# Patient Record
Sex: FEMALE | Race: WHITE | Employment: FULL TIME | URBAN - NONMETROPOLITAN AREA
[De-identification: names, ages, dates, MRNs, and addresses within clinical notes are randomized per-mention and may not be internally consistent; named-entity substitution may affect disease eponyms.]

---

## 2020-11-30 ENCOUNTER — HOSPITAL ENCOUNTER (EMERGENCY)
Age: 25
Discharge: HOME | End: 2020-11-30
Attending: EMERGENCY MEDICINE

## 2020-11-30 VITALS
SYSTOLIC BLOOD PRESSURE: 153 MMHG | OXYGEN SATURATION: 100 % | HEIGHT: 62 IN | BODY MASS INDEX: 43.06 KG/M2 | TEMPERATURE: 98.1 F | HEART RATE: 81 BPM | DIASTOLIC BLOOD PRESSURE: 85 MMHG | WEIGHT: 234 LBS | RESPIRATION RATE: 21 BRPM

## 2020-11-30 DIAGNOSIS — I10 HYPERTENSION, UNSPECIFIED TYPE: ICD-10-CM

## 2020-11-30 DIAGNOSIS — R42 DIZZINESS: Primary | ICD-10-CM

## 2020-11-30 LAB
ALBUMIN SERPL-MCNC: 3.9 G/DL (ref 3.5–4.7)
ALBUMIN/GLOB SERPL: 1.2 {RATIO}
ALP SERPL-CCNC: 66 U/L (ref 38–126)
ALT SERPL-CCNC: 76 U/L (ref 3–52)
ANION GAP SERPL CALC-SCNC: 8 MMOL/L
APPEARANCE UR: ABNORMAL
AST SERPL W P-5'-P-CCNC: 44 U/L (ref 14–74)
ATRIAL RATE: 80 BPM
BACTERIA URNS QL MICRO: ABNORMAL /HPF
BASOPHILS # BLD: 0 K/UL (ref 0–0.1)
BASOPHILS NFR BLD: 1 % (ref 0–2)
BILIRUB SERPL-MCNC: 0.4 MG/DL (ref 0.2–1)
BILIRUB UR QL: NEGATIVE
BUN SERPL-MCNC: 12 MG/DL (ref 9–21)
BUN/CREAT SERPL: 17
CA-I BLD-MCNC: 8.9 MG/DL (ref 8.5–10.5)
CALCULATED P AXIS, ECG09: 56 DEGREES
CALCULATED R AXIS, ECG10: 34 DEGREES
CALCULATED T AXIS, ECG11: -4 DEGREES
CHLORIDE SERPL-SCNC: 104 MMOL/L (ref 94–111)
CO2 SERPL-SCNC: 26 MMOL/L (ref 21–33)
COLOR UR: ABNORMAL
CREAT SERPL-MCNC: 0.7 MG/DL (ref 0.7–1.2)
DIAGNOSIS, 93000: NORMAL
EOSINOPHIL # BLD: 0.2 K/UL (ref 0–0.4)
EOSINOPHIL NFR BLD: 3 % (ref 0–5)
EPITH CASTS URNS QL MICRO: ABNORMAL /LPF (ref 0–20)
ERYTHROCYTE [DISTWIDTH] IN BLOOD BY AUTOMATED COUNT: 12.5 % (ref 11.6–14.5)
GLOBULIN SER CALC-MCNC: 3.2 G/DL
GLUCOSE SERPL-MCNC: 107 MG/DL (ref 70–110)
GLUCOSE UR STRIP.AUTO-MCNC: NEGATIVE MG/DL
HCG UR QL: NEGATIVE
HCT VFR BLD AUTO: 36.2 % (ref 35–45)
HGB BLD-MCNC: 11.8 G/DL (ref 12–16)
HGB UR QL STRIP: ABNORMAL
IMM GRANULOCYTES # BLD AUTO: 0 K/UL
IMM GRANULOCYTES NFR BLD AUTO: 0 %
KETONES UR QL STRIP.AUTO: NEGATIVE MG/DL
LEUKOCYTE ESTERASE UR QL STRIP.AUTO: ABNORMAL
LYMPHOCYTES # BLD: 1.8 K/UL (ref 0.9–3.6)
LYMPHOCYTES NFR BLD: 29 % (ref 21–52)
MCH RBC QN AUTO: 28.8 PG (ref 24–34)
MCHC RBC AUTO-ENTMCNC: 32.6 G/DL (ref 31–37)
MCV RBC AUTO: 88.3 FL (ref 74–97)
MONOCYTES # BLD: 0.3 K/UL (ref 0.05–1.2)
MONOCYTES NFR BLD: 5 % (ref 3–10)
NEUTS SEG # BLD: 4 K/UL (ref 1.8–8)
NEUTS SEG NFR BLD: 62 % (ref 40–73)
NITRITE UR QL STRIP.AUTO: NEGATIVE
P-R INTERVAL, ECG05: 145 MS
PH UR STRIP: 5 [PH] (ref 5–9)
PLATELET # BLD AUTO: 270 K/UL (ref 135–420)
PMV BLD AUTO: 10.2 FL (ref 9.2–11.8)
POTASSIUM SERPL-SCNC: 3.9 MMOL/L (ref 3.2–5.1)
PROT SERPL-MCNC: 7.1 G/DL (ref 6.1–8.4)
PROT UR STRIP-MCNC: 15 MG/DL
Q-T INTERVAL, ECG07: 375 MS
QRS DURATION, ECG06: 94 MS
QTC CALCULATION (BEZET), ECG08: 433 MS
RBC # BLD AUTO: 4.1 M/UL (ref 4.2–5.3)
RBC #/AREA URNS HPF: ABNORMAL /HPF (ref 0–2)
SODIUM SERPL-SCNC: 138 MMOL/L (ref 135–145)
SP GR UR REFRACTOMETRY: 1.02 (ref 1–1.03)
TRICHOMONAS UR QL MICRO: PRESENT
TROPONIN I SERPL-MCNC: <0.02 NG/ML (ref 0.02–0.05)
UROBILINOGEN UR QL STRIP.AUTO: 0.2 EU/DL (ref 0.2–1)
VENTRICULAR RATE, ECG03: 80 BPM
WBC # BLD AUTO: 6.4 K/UL (ref 4.6–13.2)
WBC URNS QL MICRO: ABNORMAL /HPF (ref 0–4)

## 2020-11-30 PROCEDURE — 80053 COMPREHEN METABOLIC PANEL: CPT

## 2020-11-30 PROCEDURE — 81001 URINALYSIS AUTO W/SCOPE: CPT

## 2020-11-30 PROCEDURE — 81025 URINE PREGNANCY TEST: CPT

## 2020-11-30 PROCEDURE — 84484 ASSAY OF TROPONIN QUANT: CPT

## 2020-11-30 PROCEDURE — 99284 EMERGENCY DEPT VISIT MOD MDM: CPT

## 2020-11-30 PROCEDURE — 85025 COMPLETE CBC W/AUTO DIFF WBC: CPT

## 2020-11-30 PROCEDURE — 93005 ELECTROCARDIOGRAM TRACING: CPT

## 2020-11-30 RX ORDER — METFORMIN HYDROCHLORIDE 500 MG/1
500 TABLET ORAL 2 TIMES DAILY WITH MEALS
COMMUNITY

## 2020-11-30 RX ORDER — DILTIAZEM HYDROCHLORIDE EXTENDED-RELEASE TABLETS 240 MG/1
240 TABLET, EXTENDED RELEASE ORAL DAILY
COMMUNITY
End: 2022-07-15

## 2020-11-30 NOTE — LETTER
[address] EMERGENCY DEPT 
[facility] [id number] 
[phone] Work/School Note Date: 11/30/2020 To Whom It May concern: 
 
Anabella Estevez was seen and treated today in the emergency room by the following provider(s): 
Attending Provider: Marleny Mcclain MD.   
 
Anabella Estevez is excused from work/school on 11/30/20 and 12/01/20. She is medically clear to return to work/school on 12/2/2020.   
 
 
Sincerely,

## 2020-11-30 NOTE — DISCHARGE INSTRUCTIONS
Please see your doctor or clinic in 24-48 hours. Please return for increased pain, fevers, chills, shortness of breath or any other concerns.

## 2020-11-30 NOTE — ED PROVIDER NOTES
EMERGENCY DEPARTMENT HISTORY AND PHYSICAL EXAM      Date: 2020  Patient Name: Thao Pearson    History of Presenting Illness     Chief Complaint   Patient presents with    Dizziness    Hypertension       History Provided By: Patient    HPI: Thao Pearson, 30 y.o. female with PMHx of DM presents to the ED with complaints of lightheadedness/dizziness which is worse with ambulation, beginning when she woke up this morning (approximately 5-6 hours ago). Pt notes associated decreased appetite and nausea, but was able to eat breakfast despite these sxs. She arrived at work and confessed to a coworker that she was not feeling well. Her coworker checked her BP, finding it to be 160/103, and encouraged her to visit the ED. Pt denies any vomiting, chest pain, changes in bowel/bladder function, fever, chills, anosmia, or ageusia. 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 Her menses began today and has been regular. Pt reports a BGL of 125 mg/dL this morning, states her sugars have been normal recently. There are no other complaints, changes, or physical findings at this time. PCP: JORDI Harvey    No current facility-administered medications on file prior to encounter. Current Outpatient Medications on File Prior to Encounter   Medication Sig Dispense Refill    dilTIAZem ER (Cardizem LA) 240 mg tablet Take 240 mg by mouth daily.  metFORMIN (GLUCOPHAGE) 500 mg tablet Take 500 mg by mouth two (2) times daily (with meals). Past History     Past Medical History:  Past Medical History:   Diagnosis Date    Atrial fibrillation ([address]     Diabetes ([facility]        Past Surgical History:  Past Surgical History:   Procedure Laterality Date    HX  SECTION N/A     x 2       Family History:  History reviewed. No pertinent family history.     Social History:  Social History     Tobacco Use    Smoking status: Never Smoker    Smokeless tobacco: Never Used   Substance Use Topics    Alcohol use: Yes     Comment: occ    Drug use: Never       Allergies: Allergies   Allergen Reactions    Doxycycline Nausea and Vomiting         Review of Systems   Review of Systems   Constitutional: Positive for appetite change (decreased). Negative for chills, fatigue and fever. HENT: Negative for congestion. No anosmia or ageusia. Eyes: Negative for visual disturbance. Respiratory: Negative for shortness of breath. Cardiovascular: Negative for chest pain and leg swelling. Gastrointestinal: Positive for nausea. Negative for abdominal pain, constipation, diarrhea and vomiting. Endocrine: Negative for polyuria. Genitourinary: Negative for dysuria and menstrual problem. Skin: Negative for rash. Neurological: Positive for dizziness and light-headedness. Negative for weakness. Psychiatric/Behavioral: Negative for behavioral problems. Physical Exam   Physical Exam  Vitals signs and nursing note reviewed. Constitutional:       General: She is awake. She is not in acute distress. Appearance: She is well-developed. She is not diaphoretic. HENT:      Head: Normocephalic and atraumatic. Right Ear: External ear normal.      Left Ear: External ear normal.      Mouth/Throat:      Mouth: Mucous membranes are moist.   Eyes:      General: No scleral icterus. Extraocular Movements: Extraocular movements intact. Conjunctiva/sclera: Conjunctivae normal.      Pupils: Pupils are equal, round, and reactive to light. Neck:      Musculoskeletal: Normal range of motion and neck supple. Thyroid: No thyromegaly. Vascular: No JVD. Trachea: No tracheal deviation. Cardiovascular:      Rate and Rhythm: Normal rate and regular rhythm. Heart sounds: Normal heart sounds. No murmur. No friction rub. No gallop. Pulmonary:      Effort: Pulmonary effort is normal. No respiratory distress.       Breath sounds: Normal breath sounds. No stridor. No wheezing, rhonchi or rales. Abdominal:      General: There is no distension. Palpations: Abdomen is soft. Tenderness: There is no abdominal tenderness. There is no guarding or rebound. Musculoskeletal: Normal range of motion. General: No tenderness. Lymphadenopathy:      Cervical: No cervical adenopathy. Skin:     General: Skin is warm and dry. Findings: No erythema or rash. Neurological:      Mental Status: She is alert and oriented to person, place, and time. Cranial Nerves: No cranial nerve deficit. Sensory: Sensation is intact. Motor: Motor function is intact. No weakness or pronator drift. Coordination: Coordination is intact. Finger-Nose-Finger Test normal.   Psychiatric:         Behavior: Behavior normal.         Thought Content:  Thought content normal.         Judgment: Judgment normal.         Diagnostic Study Results     Labs -     Recent Results (from the past 12 hour(s))   URINALYSIS W/ RFLX MICROSCOPIC    Collection Time: 11/30/20 12:30 PM   Result Value Ref Range    Color Yellow/Straw      Appearance Hazy (A) Clear      Specific gravity 1.025 1.003 - 1.035      pH (UA) 5.0 5.0 - 9.0      Protein 15 (A) Negative mg/dL    Glucose Negative Negative mg/dL    Ketone Negative Negative mg/dL    Bilirubin Negative Negative      Blood Large (A) Negative      Urobilinogen 0.2 0.2 - 1.0 EU/dL    Nitrites Negative Negative      Leukocyte Esterase Small (A) Negative     HCG URINE, QL    Collection Time: 11/30/20 12:30 PM   Result Value Ref Range    HCG urine, QL Negative Negative     URINE MICROSCOPIC    Collection Time: 11/30/20 12:30 PM   Result Value Ref Range    WBC 20-50 0 - 4 /hpf    RBC 0-5 0 - 2 /hpf    Epithelial cells Many 0 - 20 /lpf    Bacteria 1+ (A) None /hpf    Trichomonas Present     EKG, 12 LEAD, INITIAL    Collection Time: 11/30/20  1:27 PM   Result Value Ref Range    Ventricular Rate 80 BPM    Atrial Rate 80 BPM P-R Interval 145 ms    QRS Duration 94 ms    Q-T Interval 375 ms    QTC Calculation (Bezet) 433 ms    Calculated P Axis 56 degrees    Calculated R Axis 34 degrees    Calculated T Axis -4 degrees    Diagnosis       Sinus rhythm  Borderline T abnormalities, anterior leads     CBC WITH AUTOMATED DIFF    Collection Time: 11/30/20  2:00 PM   Result Value Ref Range    WBC 6.4 4.6 - 13.2 K/uL    RBC 4.10 (L) 4.20 - 5.30 M/uL    HGB 11.8 (L) 12.0 - 16.0 g/dL    HCT 36.2 35.0 - 45.0 %    MCV 88.3 74.0 - 97.0 FL    MCH 28.8 24.0 - 34.0 PG    MCHC 32.6 31.0 - 37.0 g/dL    RDW 12.5 11.6 - 14.5 %    PLATELET 840 393 - 718 K/uL    MPV 10.2 9.2 - 11.8 FL    NEUTROPHILS 62 40 - 73 %    LYMPHOCYTES 29 21 - 52 %    MONOCYTES 5 3 - 10 %    EOSINOPHILS 3 0 - 5 %    BASOPHILS 1 0 - 2 %    IMMATURE GRANULOCYTES 0 %    ABS. NEUTROPHILS 4.0 1.8 - 8.0 K/UL    ABS. LYMPHOCYTES 1.8 0.9 - 3.6 K/UL    ABS. MONOCYTES 0.3 0.05 - 1.2 K/UL    ABS. EOSINOPHILS 0.2 0.0 - 0.4 K/UL    ABS. BASOPHILS 0.0 0.0 - 0.1 K/UL    ABS. IMM. GRANS. 0.0 K/UL   METABOLIC PANEL, COMPREHENSIVE    Collection Time: 11/30/20  2:00 PM   Result Value Ref Range    Sodium 138 135 - 145 mmol/L    Potassium 3.9 3.2 - 5.1 mmol/L    Chloride 104 94 - 111 mmol/L    CO2 26 21 - 33 mmol/L    Anion gap 8 mmol/L    Glucose 107 70 - 110 mg/dL    BUN 12 9 - 21 mg/dL    Creatinine 0.70 0.70 - 1.20 mg/dL    BUN/Creatinine ratio 17      GFR est AA >60 ml/min/1.73m2    GFR est non-AA >60 ml/min/1.73m2    Calcium 8.9 8.5 - 10.5 mg/dL    Bilirubin, total 0.4 0.2 - 1.0 mg/dL    AST (SGOT) 44 14 - 74 U/L    ALT (SGPT) 76 (H) 3 - 52 U/L    Alk.  phosphatase 66 38 - 126 U/L    Protein, total 7.1 6.1 - 8.4 g/dL    Albumin 3.9 3.5 - 4.7 g/dL    Globulin 3.2 g/dL    A-G Ratio 1.2     TROPONIN I    Collection Time: 11/30/20  2:00 PM   Result Value Ref Range    Troponin-I, Qt. <0.02 (L) 0.02 - 0.05 ng/mL       Radiologic Studies -   No orders to display     CT Results  (Last 48 hours)    None CXR Results  (Last 48 hours)    None            Medical Decision Making   I am the first provider for this patient. I reviewed the vital signs, available nursing notes, past medical history, past surgical history, family history and social history. Vital Signs-Reviewed the patient's vital signs. Patient Vitals for the past 12 hrs:   Temp Pulse Resp BP SpO2   11/30/20 1300 98.2 °F (36.8 °C) 83 20 (!) 146/75 100 %     EKG interpretation: (Preliminary)  NSR. Isolated T wave inversion, lead III. T wave flattening, lateral leads. No prior for comparison. EKG read by Dr. Ronal Vega at 6988. Records Reviewed: Nursing Notes    ED Course as of Nov 30 1702   Mon Nov 30, 2020   1304 Pt well appearing, non focal neuro exam- Given DM will screen trop, low suspicion for ACS, given length of sx, if trop neg will hold on further testing    [BT]   7897 Provider Attestation:  I personally performed the services described in the documentation, reviewed the documentation, as recorded by the chevy Vega MD (2:33 PM). [BT]      ED Course User Index  [BT] Chirag Mcelroy MD         ED Course:   0206  Initial assessment performed. The patients presenting problems have been discussed, and they are in agreement with the care plan formulated and outlined with them. I have encouraged them to ask questions as they arise throughout their visit. Provider Notes (Medical Decision Making): PROCEDURES    Procedures    Consultations:     Consultations: - NONE    Disposition     Disposition: DC- Adult Discharges: All of the diagnostic tests were reviewed and questions answered. Diagnosis, care plan and treatment options were discussed. The patient understands the instructions and will follow up as directed. The patients results have been reviewed with them. They have been counseled regarding their diagnosis.   The patient verbally convey understanding and agreement of the signs, symptoms, diagnosis, treatment and prognosis and additionally agrees to follow up as recommended with their PCP in 24 - 48 hours. They also agree with the care-plan and convey that all of their questions have been answered. I have also put together some discharge instructions for them that include: 1) educational information regarding their diagnosis, 2) how to care for their diagnosis at home, as well a 3) list of reasons why they would want to return to the ED prior to their follow-up appointment, should their condition change. Neena Justyn Klein's  results have been reviewed with her. She has been counseled regarding her diagnosis, treatment, and plan. She verbally conveys understanding and agreement of the signs, symptoms, diagnosis, treatment and prognosis and additionally agrees to follow up as discussed. She also agrees with the care-plan and conveys that all of her questions have been answered. I have also provided discharge instructions for her that include: educational information regarding their diagnosis and treatment, and list of reasons why they would want to return to the ED prior to their follow-up appointment, should her condition change.      Labs Reviewed   URINALYSIS W/ RFLX MICROSCOPIC - Abnormal; Notable for the following components:       Result Value    Appearance Hazy (*)     Protein 15 (*)     Blood Large (*)     Leukocyte Esterase Small (*)     All other components within normal limits   CBC WITH AUTOMATED DIFF - Abnormal; Notable for the following components:    RBC 4.10 (*)     HGB 11.8 (*)     All other components within normal limits   METABOLIC PANEL, COMPREHENSIVE - Abnormal; Notable for the following components:    ALT (SGPT) 76 (*)     All other components within normal limits   TROPONIN I - Abnormal; Notable for the following components:    Troponin-I, Qt. <0.02 (*)     All other components within normal limits   URINE MICROSCOPIC - Abnormal; Notable for the following components:    Bacteria 1+ (*)     All other components within normal limits   HCG URINE, QL        Recent Results (from the past 12 hour(s))   URINALYSIS W/ RFLX MICROSCOPIC    Collection Time: 11/30/20 12:30 PM   Result Value Ref Range    Color Yellow/Straw      Appearance Hazy (A) Clear      Specific gravity 1.025 1.003 - 1.035      pH (UA) 5.0 5.0 - 9.0      Protein 15 (A) Negative mg/dL    Glucose Negative Negative mg/dL    Ketone Negative Negative mg/dL    Bilirubin Negative Negative      Blood Large (A) Negative      Urobilinogen 0.2 0.2 - 1.0 EU/dL    Nitrites Negative Negative      Leukocyte Esterase Small (A) Negative     HCG URINE, QL    Collection Time: 11/30/20 12:30 PM   Result Value Ref Range    HCG urine, QL Negative Negative     URINE MICROSCOPIC    Collection Time: 11/30/20 12:30 PM   Result Value Ref Range    WBC 20-50 0 - 4 /hpf    RBC 0-5 0 - 2 /hpf    Epithelial cells Many 0 - 20 /lpf    Bacteria 1+ (A) None /hpf    Trichomonas Present     EKG, 12 LEAD, INITIAL    Collection Time: 11/30/20  1:27 PM   Result Value Ref Range    Ventricular Rate 80 BPM    Atrial Rate 80 BPM    P-R Interval 145 ms    QRS Duration 94 ms    Q-T Interval 375 ms    QTC Calculation (Bezet) 433 ms    Calculated P Axis 56 degrees    Calculated R Axis 34 degrees    Calculated T Axis -4 degrees    Diagnosis       Sinus rhythm  Borderline T abnormalities, anterior leads     CBC WITH AUTOMATED DIFF    Collection Time: 11/30/20  2:00 PM   Result Value Ref Range    WBC 6.4 4.6 - 13.2 K/uL    RBC 4.10 (L) 4.20 - 5.30 M/uL    HGB 11.8 (L) 12.0 - 16.0 g/dL    HCT 36.2 35.0 - 45.0 %    MCV 88.3 74.0 - 97.0 FL    MCH 28.8 24.0 - 34.0 PG    MCHC 32.6 31.0 - 37.0 g/dL    RDW 12.5 11.6 - 14.5 %    PLATELET 403 962 - 737 K/uL    MPV 10.2 9.2 - 11.8 FL    NEUTROPHILS 62 40 - 73 %    LYMPHOCYTES 29 21 - 52 %    MONOCYTES 5 3 - 10 %    EOSINOPHILS 3 0 - 5 %    BASOPHILS 1 0 - 2 %    IMMATURE GRANULOCYTES 0 %    ABS. NEUTROPHILS 4.0 1.8 - 8.0 K/UL    ABS. LYMPHOCYTES 1.8 0.9 - 3.6 K/UL    ABS. MONOCYTES 0.3 0.05 - 1.2 K/UL    ABS. EOSINOPHILS 0.2 0.0 - 0.4 K/UL    ABS. BASOPHILS 0.0 0.0 - 0.1 K/UL    ABS. IMM. GRANS. 0.0 K/UL   METABOLIC PANEL, COMPREHENSIVE    Collection Time: 11/30/20  2:00 PM   Result Value Ref Range    Sodium 138 135 - 145 mmol/L    Potassium 3.9 3.2 - 5.1 mmol/L    Chloride 104 94 - 111 mmol/L    CO2 26 21 - 33 mmol/L    Anion gap 8 mmol/L    Glucose 107 70 - 110 mg/dL    BUN 12 9 - 21 mg/dL    Creatinine 0.70 0.70 - 1.20 mg/dL    BUN/Creatinine ratio 17      GFR est AA >60 ml/min/1.73m2    GFR est non-AA >60 ml/min/1.73m2    Calcium 8.9 8.5 - 10.5 mg/dL    Bilirubin, total 0.4 0.2 - 1.0 mg/dL    AST (SGOT) 44 14 - 74 U/L    ALT (SGPT) 76 (H) 3 - 52 U/L    Alk. phosphatase 66 38 - 126 U/L    Protein, total 7.1 6.1 - 8.4 g/dL    Albumin 3.9 3.5 - 4.7 g/dL    Globulin 3.2 g/dL    A-G Ratio 1.2     TROPONIN I    Collection Time: 11/30/20  2:00 PM   Result Value Ref Range    Troponin-I, Qt. <0.02 (L) 0.02 - 0.05 ng/mL        No orders to display        CLINICAL IMPRESSION    1. Dizziness    2. Hypertension, unspecified type        PLAN:  1. Discharge Medication List as of 11/30/2020  3:48 PM        2. Follow-up Information    None       Return to ED if worse     Diagnosis     Clinical Impression:   1. Dizziness    2. Hypertension, unspecified type        By signing my name below, I, Rafael Rodriguez ED scribe, attest that this documentation has been prepared under the direction and in presence of Dr. Eboni Parry on 11/30/20.  Electronically signed: Rafael Rodriguez, 11/30/20, 1:04 PM

## 2020-11-30 NOTE — ED TRIAGE NOTES
\"I was upstairs and I got real hot and my blood pressure was high. Nii checked it for me and it was 160/103 which is high for me, so Karen Rangel sent me to the ER. \"

## 2022-04-21 ENCOUNTER — HOSPITAL ENCOUNTER (EMERGENCY)
Age: 27
Discharge: HOME | End: 2022-04-21
Attending: EMERGENCY MEDICINE
Payer: COMMERCIAL

## 2022-04-21 ENCOUNTER — APPOINTMENT (OUTPATIENT)
Dept: GENERAL RADIOLOGY | Age: 27
End: 2022-04-21
Attending: EMERGENCY MEDICINE
Payer: COMMERCIAL

## 2022-04-21 VITALS
OXYGEN SATURATION: 95 % | TEMPERATURE: 98.1 F | DIASTOLIC BLOOD PRESSURE: 61 MMHG | WEIGHT: 236 LBS | HEART RATE: 84 BPM | BODY MASS INDEX: 43.43 KG/M2 | RESPIRATION RATE: 16 BRPM | HEIGHT: 62 IN | SYSTOLIC BLOOD PRESSURE: 121 MMHG

## 2022-04-21 DIAGNOSIS — J06.9 ACUTE UPPER RESPIRATORY INFECTION: Primary | ICD-10-CM

## 2022-04-21 LAB
ALBUMIN SERPL-MCNC: 4 G/DL (ref 3.5–4.7)
ALBUMIN/GLOB SERPL: 1.2 {RATIO}
ALP SERPL-CCNC: 63 U/L (ref 38–126)
ALT SERPL-CCNC: 96 U/L (ref 3–52)
ANION GAP SERPL CALC-SCNC: 14 MMOL/L
AST SERPL W P-5'-P-CCNC: 52 U/L (ref 14–74)
ATRIAL RATE: 107 BPM
BASOPHILS # BLD: 0.1 K/UL (ref 0–0.1)
BASOPHILS NFR BLD: 1 % (ref 0–2)
BILIRUB SERPL-MCNC: 0.9 MG/DL (ref 0.2–1)
BUN SERPL-MCNC: 11 MG/DL (ref 9–21)
BUN/CREAT SERPL: 14
CA-I BLD-MCNC: 9.4 MG/DL (ref 8.5–10.5)
CALCULATED P AXIS, ECG09: 150 DEGREES
CALCULATED R AXIS, ECG10: 121 DEGREES
CALCULATED T AXIS, ECG11: -43 DEGREES
CHLORIDE SERPL-SCNC: 99 MMOL/L (ref 94–111)
CO2 SERPL-SCNC: 24 MMOL/L (ref 21–33)
CREAT SERPL-MCNC: 0.8 MG/DL (ref 0.7–1.2)
D DIMER PPP FEU-MCNC: 0.38 UG/ML(FEU)
DIAGNOSIS, 93000: NORMAL
DIFFERENTIAL METHOD BLD: NORMAL
EOSINOPHIL # BLD: 0.1 K/UL (ref 0–0.4)
EOSINOPHIL NFR BLD: 1 % (ref 0–5)
ERYTHROCYTE [DISTWIDTH] IN BLOOD BY AUTOMATED COUNT: 12.1 % (ref 11.6–14.5)
GLOBULIN SER CALC-MCNC: 3.4 G/DL
GLUCOSE SERPL-MCNC: 178 MG/DL (ref 70–110)
HCG SERPL QL: NEGATIVE
HCT VFR BLD AUTO: 39.7 % (ref 35–45)
HGB BLD-MCNC: 13.2 G/DL (ref 12–16)
IMM GRANULOCYTES # BLD AUTO: 0 K/UL (ref 0–0.04)
IMM GRANULOCYTES NFR BLD AUTO: 0 % (ref 0–0.5)
LYMPHOCYTES # BLD: 2.9 K/UL (ref 0.9–3.6)
LYMPHOCYTES NFR BLD: 31 % (ref 21–52)
MCH RBC QN AUTO: 28.7 PG (ref 24–34)
MCHC RBC AUTO-ENTMCNC: 33.2 G/DL (ref 31–37)
MCV RBC AUTO: 86.3 FL (ref 78–100)
MONOCYTES # BLD: 0.5 K/UL (ref 0.05–1.2)
MONOCYTES NFR BLD: 5 % (ref 3–10)
NEUTS SEG # BLD: 5.6 K/UL (ref 1.8–8)
NEUTS SEG NFR BLD: 62 % (ref 40–73)
NRBC # BLD: 0 K/UL (ref 0–0.01)
NRBC BLD-RTO: 0 PER 100 WBC
P-R INTERVAL, ECG05: 149 MS
PLATELET # BLD AUTO: 212 K/UL (ref 135–420)
PMV BLD AUTO: 10.3 FL (ref 9.2–11.8)
POTASSIUM SERPL-SCNC: 3.6 MMOL/L (ref 3.2–5.1)
PROT SERPL-MCNC: 7.4 G/DL (ref 6.1–8.4)
Q-T INTERVAL, ECG07: 326 MS
QRS DURATION, ECG06: 92 MS
QTC CALCULATION (BEZET), ECG08: 435 MS
RBC # BLD AUTO: 4.6 M/UL (ref 4.2–5.3)
SODIUM SERPL-SCNC: 137 MMOL/L (ref 135–145)
VENTRICULAR RATE, ECG03: 107 BPM
WBC # BLD AUTO: 9.1 K/UL (ref 4.6–13.2)

## 2022-04-21 PROCEDURE — 71046 X-RAY EXAM CHEST 2 VIEWS: CPT

## 2022-04-21 PROCEDURE — 84703 CHORIONIC GONADOTROPIN ASSAY: CPT

## 2022-04-21 PROCEDURE — 94640 AIRWAY INHALATION TREATMENT: CPT

## 2022-04-21 PROCEDURE — 74011000250 HC RX REV CODE- 250: Performed by: EMERGENCY MEDICINE

## 2022-04-21 PROCEDURE — 99285 EMERGENCY DEPT VISIT HI MDM: CPT

## 2022-04-21 PROCEDURE — 80053 COMPREHEN METABOLIC PANEL: CPT

## 2022-04-21 PROCEDURE — 85025 COMPLETE CBC W/AUTO DIFF WBC: CPT

## 2022-04-21 PROCEDURE — 74011250636 HC RX REV CODE- 250/636: Performed by: EMERGENCY MEDICINE

## 2022-04-21 PROCEDURE — 85379 FIBRIN DEGRADATION QUANT: CPT

## 2022-04-21 PROCEDURE — 93005 ELECTROCARDIOGRAM TRACING: CPT

## 2022-04-21 PROCEDURE — 74011250637 HC RX REV CODE- 250/637: Performed by: EMERGENCY MEDICINE

## 2022-04-21 RX ORDER — INHALER, ASSIST DEVICES
1 SPACER (EA) MISCELLANEOUS AS NEEDED
Qty: 1 EACH | Refills: 0 | Status: SHIPPED | OUTPATIENT
Start: 2022-04-21

## 2022-04-21 RX ORDER — MESALAMINE 1000 MG/1
SUPPOSITORY RECTAL
COMMUNITY

## 2022-04-21 RX ORDER — IPRATROPIUM BROMIDE AND ALBUTEROL SULFATE 2.5; .5 MG/3ML; MG/3ML
3 SOLUTION RESPIRATORY (INHALATION)
Status: COMPLETED | OUTPATIENT
Start: 2022-04-21 | End: 2022-04-21

## 2022-04-21 RX ORDER — ALBUTEROL SULFATE 90 UG/1
2 AEROSOL, METERED RESPIRATORY (INHALATION)
Qty: 18 G | Refills: 0 | Status: SHIPPED | OUTPATIENT
Start: 2022-04-21

## 2022-04-21 RX ORDER — DICYCLOMINE HYDROCHLORIDE 20 MG/1
TABLET ORAL
COMMUNITY
Start: 2022-04-04

## 2022-04-21 RX ORDER — FUROSEMIDE 20 MG/1
TABLET ORAL
COMMUNITY

## 2022-04-21 RX ORDER — IBUPROFEN 400 MG/1
800 TABLET ORAL
Status: COMPLETED | OUTPATIENT
Start: 2022-04-21 | End: 2022-04-21

## 2022-04-21 RX ADMIN — SODIUM CHLORIDE 1000 ML: 9 INJECTION, SOLUTION INTRAVENOUS at 08:00

## 2022-04-21 RX ADMIN — IBUPROFEN 800 MG: 400 TABLET, FILM COATED ORAL at 07:30

## 2022-04-21 RX ADMIN — IPRATROPIUM BROMIDE AND ALBUTEROL SULFATE 3 ML: .5; 2.5 SOLUTION RESPIRATORY (INHALATION) at 07:27

## 2022-04-21 NOTE — ED TRIAGE NOTES
Patient states she has had body aches, sore throat, congestion, fever that started 2 days ago. Patient reports fever started this morning and she took Tylenol for.

## 2022-04-21 NOTE — Clinical Note
[facility] EMERGENCY DEPT  [address] [address]  [phone]    Work/School Note    Date: 4/21/2022    To Whom It May concern:    Jenniffer Levi was seen and treated today in the emergency room by the following provider(s):  Attending Provider: Zen Pino MD.      Jenniffer Levi is excused from work/school on 04/21/22 and 04/22/22. She is medically clear to return to work/school on 4/23/2022.        Sincerely,          Abhay Ellington MD

## 2022-04-21 NOTE — ED NOTES
A/P: The patient is a 22-year-old woman who was turned over to me by Dr. Nichelle Buckley at the end of his shift. Briefly the patient presented to the ED today with cough and congestion for 2 days and then had a fever this morning. She is also complaining of some shortness of breath especially with exertion. She did receive albuterol and some Motrin initially. She remained tachycardic and her heart rate was going up into the 120s. At that point I decided that we should probably work her up for PE. Her D-dimer was negative. She did receive IV fluids and her heart rate did respond and dropped to 84. She was afebrile in the ED. She most likely has a viral syndrome. She will be discharged home with a prescription for albuterol and will be advised to follow-up with her primary care physician in 2 to 3 days. Return precautions have been given. Recent Results (from the past 12 hour(s))   CBC WITH AUTOMATED DIFF    Collection Time: 04/21/22  7:55 AM   Result Value Ref Range    WBC 9.1 4.6 - 13.2 K/uL    RBC 4.60 4.20 - 5.30 M/uL    HGB 13.2 12.0 - 16.0 g/dL    HCT 39.7 35.0 - 45.0 %    MCV 86.3 78.0 - 100.0 FL    MCH 28.7 24.0 - 34.0 PG    MCHC 33.2 31.0 - 37.0 g/dL    RDW 12.1 11.6 - 14.5 %    PLATELET 282 376 - 802 K/uL    MPV 10.3 9.2 - 11.8 FL    NRBC 0.0 0.0  WBC    ABSOLUTE NRBC 0.00 0.00 - 0.01 K/uL    NEUTROPHILS 62 40 - 73 %    LYMPHOCYTES 31 21 - 52 %    MONOCYTES 5 3 - 10 %    EOSINOPHILS 1 0 - 5 %    BASOPHILS 1 0 - 2 %    IMMATURE GRANULOCYTES 0 0 - 0.5 %    ABS. NEUTROPHILS 5.6 1.8 - 8.0 K/UL    ABS. LYMPHOCYTES 2.9 0.9 - 3.6 K/UL    ABS. MONOCYTES 0.5 0.05 - 1.2 K/UL    ABS. EOSINOPHILS 0.1 0.0 - 0.4 K/UL    ABS. BASOPHILS 0.1 0.0 - 0.1 K/UL    ABS. IMM.  GRANS. 0.0 0.00 - 0.04 K/UL    DF AUTOMATED     D DIMER    Collection Time: 04/21/22  7:55 AM   Result Value Ref Range    D DIMER 0.38 <0.46 ug/ml(FEU)   METABOLIC PANEL, COMPREHENSIVE    Collection Time: 04/21/22  7:55 AM   Result Value Ref Range    Sodium 137 135 - 145 mmol/L    Potassium 3.6 3.2 - 5.1 mmol/L    Chloride 99 94 - 111 mmol/L    CO2 24 21 - 33 mmol/L    Anion gap 14 mmol/L    Glucose 178 (H) 70 - 110 mg/dL    BUN 11 9 - 21 mg/dL    Creatinine 0.80 0.70 - 1.20 mg/dL    BUN/Creatinine ratio 14      GFR est AA >60 ml/min/1.73m2    GFR est non-AA >60 ml/min/1.73m2    Calcium 9.4 8.5 - 10.5 mg/dL    Bilirubin, total 0.9 0.2 - 1.0 mg/dL    AST (SGOT) 52 14 - 74 U/L    ALT (SGPT) 96 (H) 3 - 52 U/L    Alk. phosphatase 63 38 - 126 U/L    Protein, total 7.4 6.1 - 8.4 g/dL    Albumin 4.0 3.5 - 4.7 g/dL    Globulin 3.4 g/dL    A-G Ratio 1.2     HCG QL SERUM    Collection Time: 04/21/22  7:55 AM   Result Value Ref Range    HCG, Ql. Negative         XR CHEST PA LAT   Final Result      Negative radiographic examination. _______________                             EKG: Sinus tachycardia at a rate of 107. Right axis deviation. Diffuse T wave abnormalities.

## 2022-04-21 NOTE — Clinical Note
[facility] EMERGENCY DEPT  [address] [id number] [phone]    Work/School Note    Date: 4/21/2022    To Whom It May concern:    Cinthya Peters was seen and treated today in the emergency room by the following provider(s):  Attending Provider: Molly Holt MD.      Cinthya Peters is excused from work/school on 04/21/22 and 04/22/22. She is medically clear to return to work/school on 4/23/2022.        Sincerely,          Chelsey Simmons MD

## 2022-04-21 NOTE — Clinical Note
[facility] EMERGENCY DEPT  [address] [id number] [phone]    Work/School Note    Date: 4/21/2022    To Whom It May concern:    Jenniffer Levi was seen and treated today in the emergency room by the following provider(s):  Attending Provider: Zen Pino MD.      Jenniffer Levi is excused from work/school on 04/21/22 and 04/22/22. She is medically clear to return to work/school on 4/23/2022.        Sincerely,          [facility]

## 2022-04-21 NOTE — ED PROVIDER NOTES
Pt co cough/congestion x 2 days . Thought was due to allergies until had fever this am, about 4 hours pta, 101, took tylenol then, improved. Cough was non prod. Mild nasal congestion. C/o chronic loose stools w abd pain, no change. No cp. Fatigue/sob w exertion. No sob currently. No n/v. No back pain. No urinary changes. No chance of current pregnancy per pt, declines testing. H/o wheezing one time 10-15 yrs ago, none since. Non smoker. H/o tachycardia, w probable a fib in the past but pt not sure. Past Medical History:   Diagnosis Date    [facility]rial fibrillation ([facility]     Diabetes ([facility]        Past Surgical History:   Procedure Laterality Date    HX  SECTION N/A     x 2         History reviewed. No pertinent family history. Social History     Socioeconomic History    Marital status: SINGLE     Spouse name: Not on file    Number of children: Not on file    Years of education: Not on file    Highest education level: Not on file   Occupational History    Not on file   Tobacco Use    Smoking status: Never Smoker    Smokeless tobacco: Never Used   Substance and Sexual Activity    Alcohol use: Yes     Comment: occ    Drug use: Never    Sexual activity: Not on file   Other Topics Concern    Not on file   Social History Narrative    Not on file     Social Determinants of Health     Financial Resource Strain:     Difficulty of Paying Living Expenses: Not on file   Food Insecurity:     Worried About Running Out of Food in the Last Year: Not on file    Bran of Food in the Last Year: Not on file   Transportation Needs:     Lack of Transportation (Medical): Not on file    Lack of Transportation (Non-Medical):  Not on file   Physical Activity:     Days of Exercise per Week: Not on file    Minutes of Exercise per Session: Not on file   Stress:     Feeling of Stress : Not on file   Social Connections:     Frequency of Communication with Friends and Family: Not on file    Frequency of Social Gatherings with Friends and Family: Not on file    Attends Mormon Services: Not on file    Active Member of Clubs or Organizations: Not on file    Attends Club or Organization Meetings: Not on file    Marital Status: Not on file   Intimate Partner Violence:     Fear of Current or Ex-Partner: Not on file    Emotionally Abused: Not on file    Physically Abused: Not on file    Sexually Abused: Not on file   Housing Stability:     Unable to Pay for Housing in the Last Year: Not on file    Number of [place] in the Last Year: Not on file    Unstable Housing in the Last Year: Not on file         ALLERGIES: Doxycycline    Review of Systems   Constitutional: Positive for fever. HENT: Positive for congestion. Negative for trouble swallowing. Respiratory: Positive for cough. Cardiovascular: Negative for chest pain. Gastrointestinal: Negative for nausea and vomiting. Musculoskeletal: Negative for back pain. Skin: Negative for rash. Neurological: Negative for light-headedness. All other systems reviewed and are negative. Vitals:    04/21/22 0630   BP: (!) 161/100   Pulse: (!) 125   Resp: 18   Temp: 98.5 °F (36.9 °C)   SpO2: 98%   Weight: 107 kg (236 lb)   Height: 5' 2\" (1.575 m)            Physical Exam  Vitals and nursing note reviewed. Constitutional:       Appearance: She is well-developed. She is not diaphoretic. HENT:      Head: Normocephalic and atraumatic. Eyes:      Pupils: Pupils are equal, round, and reactive to light. Cardiovascular:      Rate and Rhythm: Regular rhythm. Tachycardia present. Heart sounds: No murmur heard. Pulmonary:      Effort: Pulmonary effort is normal.      Breath sounds: Wheezing (occas at bases b/l) present. Abdominal:      Palpations: Abdomen is soft. Tenderness: There is no abdominal tenderness. Musculoskeletal:         General: No tenderness. Normal range of motion.       Cervical back: Normal range of motion. Skin:     General: Skin is dry. Capillary Refill: Capillary refill takes less than 2 seconds. Findings: No rash. Neurological:      Mental Status: She is alert and oriented to person, place, and time. Psychiatric:         Mood and Affect: Mood normal.          MDM       Procedures    Vitals:  Patient Vitals for the past 12 hrs:   Temp Pulse Resp BP SpO2   04/21/22 0630 98.5 °F (36.9 °C) (!) 125 18 (!) 161/100 98 %         Medications ordered:   Medications - No data to display      Lab findings:  No results found for this or any previous visit (from the past 12 hour(s)). X-Ray, CT or other radiology findings or impressions:  No orders to display         Progress notes, Consult notes or additional Procedure notes:   0700 signed out to dr Lian Haas. Follow-up Information    None          Patient's Medications   Start Taking    No medications on file   Continue Taking    DICYCLOMINE (BENTYL) 20 MG TABLET    TAKE 1 TABLET 4 TIMES DAILY AS NEEDED FOR ABDOMINAL PAIN    DILTIAZEM ER (CARDIZEM LA) 240 MG TABLET    Take 240 mg by mouth daily. FUROSEMIDE (LASIX) 20 MG TABLET    furosemide 20 mg tablet   PRN    MESALAMINE (CANASA) 1,000 MG SUPPOSITORY    mesalamine 1,000 mg rectal suppository   unwrap and insert 1 suppository rectally at bedtime    METFORMIN (GLUCOPHAGE) 500 MG TABLET    Take 500 mg by mouth two (2) times daily (with meals).    These Medications have changed    No medications on file   Stop Taking    No medications on file

## 2022-04-21 NOTE — ED NOTES
Verbal shift change report given to Luke Hannah RN (oncoming nurse) by Fernando Coleman RN (offgoing nurse). Report included the following information ED Summary.

## 2022-05-13 LAB — HBA1C MFR BLD HPLC: 7.9 %

## 2022-05-19 LAB
CHOLESTEROL, TOTAL, 804501: 149 MG/DL
HDL CHOLESTEROL,HDL: 36 MG/DL
HDLC SERPL-MCNC: 4.1 MG/DL
LDL CHOL, CALCULATED: 73 MG/DL
TRIGL SERPL-MCNC: 243 MG/DL (ref ?–150)
VLDLC SERPL CALC-MCNC: 40 MG/DL

## 2022-06-27 ENCOUNTER — DOCUMENTATION ONLY (OUTPATIENT)
Dept: PHARMACY | Age: 27
End: 2022-06-27

## 2022-06-27 NOTE — LETTER
Angela 2  [address], [address]  Phone: toll free [phone] Option #3           Ms. Kike Krishnan  [address]  [address] [place] [id number]          Congratulations! You have successfully enrolled in the Be Well With Diabetes program for 2022. What you receive  Beginning July 1, you will begin receiving up to $300 in waived copays for specific medications and pharmacy-related supplies purchased through our home delivery pharmacy, [facility]. A list of eligible medications and pharmacy supplies can be found at [employer] under Be Well With Diabetes. In addition, youll receive advice and help from our pharmacists, associate care management team and diabetes educators. (And, if you also participate in the Be Well program, you can earn points and Lifestyle Management or Health Management program credit, if applicable.)    What you need to do  To maintain your benefit this year and remain eligible next year, complete the following requirements:          *Can be satisfied through [facility] on-site. **Requirements to enroll must be completed within 6 months of enrollment date **Pneumonia vaccine is dependent on previous immunization and your age. Remember, program requirements must be completed by deadlines shown. If not, your benefit may be terminated, and you will not be eligible to participate again until the following year. To keep you on track, well review your [employer] account and send reminders for action. (If you dont have a [address], submit documentation to [email or web address] or by fax to [phone].)    Congratulations and thank you for taking steps to improve your health and to Be Well With Diabetes. [address] [facility]  Phone: [phone] Option #3  Email: [email or web address]  Fax: [phone]

## 2022-06-27 NOTE — PROGRESS NOTES
Pharmacy Pop Care Documentation:   Patient has completed all the requirements by 06/25/22 and therefore will be enrolled in the DM Program on 07/01/22. Application received: via [employer]. Letter mailed to patient.     Zarina Carry, [employer]   Department, toll free: [phone] Option #3

## 2022-06-28 ENCOUNTER — DOCUMENTATION ONLY (OUTPATIENT)
Dept: PHARMACY | Age: 27
End: 2022-06-28

## 2022-06-28 NOTE — PROGRESS NOTES
AVS received for required office visit on: 5/19/22: Maynor Cobb - [address] [id number] 19 May, 2022 Type 2 diabetes mellitus without complications C20.7     Provider: Maynor Cobb

## 2022-07-01 ENCOUNTER — DOCUMENTATION ONLY (OUTPATIENT)
Dept: PHARMACY | Age: 27
End: 2022-07-01

## 2022-07-01 NOTE — PROGRESS NOTES
For Pharmacy Admin Tracking Only     CPA in place: No   Gap Closed?: Yes   Time Spent (min): 5
The application for Gentry Mohr for enrollment into the diabetes management program has been reviewed and accepted on 07/01/22.     Danuta Mock
normal (ped)...

## 2022-07-12 ENCOUNTER — TELEPHONE (OUTPATIENT)
Dept: PHARMACY | Age: 27
End: 2022-07-12

## 2022-07-12 NOTE — TELEPHONE ENCOUNTER
2022 Annual Pharmacist Visit    Called patient to schedule 2022 yearly pharmacist appointment to discuss medications for Diabetes Management Program.    Spoke to patient and appointment scheduled for 7/15/22 at 4:00pm.         Radha Carrera, [address]   Phone: [phone], option #3

## 2022-07-15 ENCOUNTER — TELEPHONE (OUTPATIENT)
Dept: PHARMACY | Age: 27
End: 2022-07-15

## 2022-07-15 NOTE — LETTER
PRESCRIPTION REQUEST   Prescriber:  Dr. Marcus Levi MD  [address] [facility], [address] [address],[address] / Alex, [address]  Phone: [phone] Fax: [phone]     Patient:  Anuel Galicia 1995  [address] [facility]  [address] [facility]  [phone] (home)      Rationale:   Agamatrix Jazz Wireless 2 is the preferred Bluetooth blood glucose monitor. Meter, strips and lancets will be covered at $0 copay through the [facility] OF THE [facility] Diabetes Management Program.       Rx: Agamatrix Jazz Wireless 2 Blood Glucose Monitor   #: 1  Refills: 0  Rx: Agamatrix Jazz Blood Glucose Test Strips  #: 200      Refills: 3  Rx: Agamatrix Ultra-Thin 33G Lancets   #: 200  Refills: 3    Directions: Use 2 time(s) daily or as directed to test blood glucose         Prescriber Response:    Prescription(s) approved (please [place] one):  YES  NO    Other response: ________________________________________      ______________________________________   __________________  Authorized By       Date     Pharmacy: [facility]                  Phone:  [phone]    Alex Robledo, [address] [facility]  Fax:  [phone]    [facility] [place], [address] [place] [address]     The information transmitted is intended only for the person or entity to which it is addressed and may contain confidential and/or privileged material. Any review, retransmission, dissemination or other use of, or taking of any action in reliance upon, this information by persons or entities other than the intended recipient is prohibited. This document contains information covered under the Privacy Act, 5 (a), and/or the [employer] and Accountability Act ([address]) and its various implementing regulations and must be protected in accordance with those provisions. If you received this in error, please contact the sender and delete the material from any computer.              PRESCRIPTION REQUEST   Prescriber:  Dr. Marcus Levi MD  [address] [facility], [address] [place] [address],[address] / Alex, [address]  Phone: [phone] Fax: [phone]     Patient:  Gentry Mohr 1995  [address] [phone] (home)      Rationale:   Agamatrix Jazz Wireless 2 is the preferred Bluetooth blood glucose monitor. Meter, strips and lancets will be covered at $0 copay through the [address] [place] [address] Diabetes Management Program. Please see prescription below for the control solution for the Agamatrix Jazz Wireless 2 system. Rx:      Agamatrix Level 2 Control Solution                           #: 1 vial Refills: 0    Directions: Use as needed to confirm if meter and test strips are working properly        Prescriber Response:    Prescription(s) approved (please [place] one):  YES  NO    Other response: ________________________________________      ______________________________________   __________________  Authorized By       Date     Pharmacy:     [address] [facility]                            Phone: [phone]                         Alex Robledo, [address] [facility]               Fax:     [phone]                         Edith Pinto, [address] [place] [address]       The information transmitted is intended only for the person or entity to which it is addressed and may contain confidential and/or privileged material. Any review, retransmission, dissemination or other use of, or taking of any action in reliance upon, this information by persons or entities other than the intended recipient is prohibited. This document contains information covered under the Privacy Act, 5 (a), and/or the [employer] and Accountability Act ([address]) and its various implementing regulations and must be protected in accordance with those provisions. If you received this in error, please contact the sender and delete the material from any computer.

## 2022-07-15 NOTE — TELEPHONE ENCOUNTER
[facility] HEALTH CLINICAL PHARMACY REVIEW - Be Well with Diabetes    Monica Dolan is a 32 y.o. female enrolled in the [facility] Employee Diabetes Program. Patient provided Hershall Cancel with verbal consent to remain in the program for this year. Patient enrolled 7/1/22    Insurance through the following employer: [facility]    Medications:   Current Outpatient Medications   Medication Sig    mesalamine (CANASA) 1,000 mg suppository mesalamine 1,000 mg rectal suppository   unwrap and insert 1 suppository rectally at bedtime    furosemide (LASIX) 20 mg tablet furosemide 20 mg tablet   PRN  - has but has not needed    dicyclomine (BENTYL) 20 mg tablet TAKE 1 TABLET 4 TIMES DAILY AS NEEDED FOR ABDOMINAL PAIN   - has but has not needed    albuterol (PROVENTIL HFA, VENTOLIN HFA, PROAIR HFA) 90 mcg/actuation inhaler Take 2 Puffs by inhalation every four (4) hours as needed for Wheezing.  - supposed to have this but does not have  - breathing OK, may get SOB with exercise and has been told exercise-induced asthma; she plans to f/u with provider for assessment/refill    inhalational spacing device (Aerochamber MV) 1 Each by Does Not Apply route as needed for Wheezing.  dilTIAZem ER (Cardizem LA) 240 mg tablet Take 240 mg by mouth daily. (Patient not taking: Reported on 4/21/2022)  - no longer taking  - has been a long time, unsure why list    metFORMIN (GLUCOPHAGE) 500 mg tablet Take 500 mg by mouth two (2) times daily (with meals). Farxiga 5 mg daily    Current Pharmacy: [facility]  Current testing supplies/frequency: no Rxs on file at [facility]; testing sporadically, would like Tiqets function  Pen needles/syringes: n/a    Allergies:   Allergies   Allergen Reactions    Doxycycline Nausea and Vomiting      Vitals/Labs:  BP Readings from Last 3 Encounters:   04/21/22 121/61   11/30/20 (!) 153/85     No results found for: Komal Ratushar, MCA2, MCA3, MCAU, MCAU2, MCALPOCT  Lab Results   Component Value Date/Time    Hemoglobin A1c, External 7.9 05/13/2022 12:00 AM     Lab Results   Component Value Date/Time    Cholesterol, Total 149 05/19/2022 12:00 AM    HDL Cholesterol 36 05/19/2022 12:00 AM    LDL CHOL, CALCULATED 73 05/19/2022 12:00 AM    VLDL,Calculated 40 05/19/2022 12:00 AM    Triglyceride 243 (A) 05/19/2022 12:00 AM     ALT (SGPT)   Date Value Ref Range Status   04/21/2022 96 (H) 3 - 52 U/L Final     AST (SGOT)   Date Value Ref Range Status   04/21/2022 52 14 - 74 U/L Final     The ASCVD Risk score (Li Mercado, et al., 2013) failed to calculate for the following reasons: The 2013 ASCVD risk score is only valid for ages 36 to 78     Lab Results   Component Value Date/Time    Creatinine 0.80 04/21/2022 07:55 AM     Estimated Creatinine Clearance: 119.4 mL/min (by C-G formula based on SCr of 0.8 mg/dL). Lab Results   Component Value Date/Time    GFR est non-AA >60 04/21/2022 07:55 AM    GFR est AA >60 04/21/2022 07:55 AM       Immunizations: There is no immunization history on file for this patient. Social History:  Social History     Tobacco Use    Smoking status: Never Smoker    Smokeless tobacco: Never Used   Substance Use Topics    Alcohol use: Yes     Comment: occ     ASSESSMENT:  Ongoing Program Requirements (Y indicates has completed for the year, N indicates needs to be completed by 12/31/2022): No - Provider Visit for DM (2nd) - states next visit scheduled in September 28th?   Yes - ACC/diabetes educator visit (will need if A1c becomes over 8%)  No - A1c (2nd)  Yes - Lipid panel  No - Urine microalbumin  No - Pneumococcal vaccination: Prevnar 20 (or [id number] followed by Pneumovax at least 1 yr later); edu to patient, will not need for 2022 requirements but encouraged to discuss with provider and we will readdress in 2023 for program requirements - she seemed agreeable to obtain  No - Influenza vaccination for Fall 2022  No - Medication adherence over 70%  Override - On statin or contraindication(s) Age < 40 years without additional ASCVD risk factors; female of child-bearing age  [de-identified] - On ACEi/ARB or contraindication(s) Normal blood pressure, urinary albumin-to-creatinine ratio, and eGFR (no UACR on file but BP and eGFR WNL; would need multiple UACR on file to assess fully so will override for now and continue to evaluate as appropriate; is also of child-bearing age)    Formulary Medication Review:  Non-formulary or medications with cost-effective alternatives: would like to change to mxHero-enabled blood sugar testing supplies. Current medications eligible for copay waiver, up to $300, through [employer]:  - metformin, Carito Marts; furosemide if needed  - Agamatrix Jazz blood sugar testing supplies     Diabetes Care:   - Glycemic Goal: <7.0%. Is not at blood glucose goal. Current regimen metformin 500 mg BID, Farxiga 5 mg daily (recently started Carito Marts). Has not tried higher dose of metformin in the past that she is aware of. Recent COVID and received steroids which pt attributes higher A1c to.     Other Considerations:  - Blood Pressure Goal: BP less than 140/90 mmHg due to history of DM: Is at blood pressure goal.   - Lipids: age under 36 yrs  - Smoking status: Never smoked    PLAN:  - Consideration(s) for provider: fax sent to prescriber Hudson Poon  · Agamatrix Jazz - BID  - DM program gaps identified:   · Ongoing requirements: Provider visit for DM (2nd), ACC/diabetes educator visit (if A1c over 8%), A1c (2nd), Urine microalbumin and Influenza vaccination for [id number]  - Education to patient: as above and:  · Overview of Be Well With Diabetes program  · Benefit/indication for pneumonia vaccine in patients with diabetes and updated CDC recs to discuss with provider  · Potential medication titrations if needed  - Follow up: PCP for identified gaps or as scheduled below  - Upcoming appointments:   Future Appointments   Date Time Provider Rylee San   7/15/2022  4:00 PM [facility] BS AMB     Provider info:  Cindi Kitchen  [address], [address], PharmD, [facility]  Department, toll free: [phone] option 3    For Pharmacy [address] in place: No   Recommendation Provided To: Provider: 1 via Fax sent to office  and Patient/Caregiver: 1 via Telephone   Intervention Detail: New Rx: 1, reason: Patient Preference   Gap Closed?: Yes   Intervention Accepted By: Provider: 0 and Patient/Caregiver: 1   Time Spent (min): 45

## 2022-10-13 ENCOUNTER — PATIENT MESSAGE (OUTPATIENT)
Dept: PHARMACY | Age: 27
End: 2022-10-13

## 2022-10-13 NOTE — LETTER
Angela 2  [address], [address]  Phone: toll free [phone] Option #3        Ms. Korey Khoury. [address] [facility] [place] [address]        Thanks, so much for taking the first step towards better health.     This letter is a friendly reminder of the requirements that are due for the [address] [place] [address] Be Well With Diabetes Program by December 31st, 2022 to avoid possible discharge from the program and to be automatically re-enrolled into the program for 2023:     Program Requirements to be completed by December 31st 2022:  2nd office visit in 2022 for diabetes   2nd A1C in 2022   Yearly urine microalbumin test  Pneumonia vaccine up to date  Yearly flu shot (for 7650-2969 season)  Medication adherence over 70%. Patients who fall below 70% will be contacted by a pharmacist in the program to discuss any issues.     You will have to submit documentation of completion of requirements if your Physician does not use the [address] [place] [address] electronic charting system or if you have your lab test done outside of [address] [place] [address]. Return the documentation to [email or web address] or by fax at [phone]     If requirements(s) are not met by December 31st 2022 you will be dis-enrolled from the program and the credit valued at up to $600 towards your diabetic medications and supplies will be revoked. You will be able to reapply the following calendar year.      **Please disregard this letter if the above requirements have been completed and documentation has been submitted. This letter was generated from information known on or before 9/1/2022**    Angela 2  Phone: toll free [phone], option 3  Email: [email or web address]  Fax Number: [phone]

## 2022-11-09 ENCOUNTER — ANESTHESIA EVENT (OUTPATIENT)
Dept: SURGERY | Age: 27
End: 2022-11-09
Payer: COMMERCIAL

## 2022-11-14 ENCOUNTER — DOCUMENTATION ONLY (OUTPATIENT)
Dept: PHARMACY | Age: 27
End: 2022-11-14

## 2022-11-14 LAB
CREATININE, URINE POC: 132.7 MG/DL
MICROALB/CREA RATIO,MCACR: 114 MG/G
MICROALBUMIN (MG/DL), 309493: 151 MG/DL

## 2022-11-14 NOTE — TELEPHONE ENCOUNTER
Received documentation of the following from 9/16/22 via e-mail:  2nd office visit in 2022 for diabetes   Yearly urine microalbumin test  Pneumonia vaccine up to date  Yearly flu shot (for 5990-2684 season)    Advised patient that she is still missing the following requirement:  2nd A1C in 2022

## 2022-11-18 ENCOUNTER — ANESTHESIA (OUTPATIENT)
Dept: SURGERY | Age: 27
End: 2022-11-18
Payer: COMMERCIAL

## 2022-11-18 ENCOUNTER — HOSPITAL ENCOUNTER (OUTPATIENT)
Age: 27
Setting detail: OBSERVATION
Discharge: HOME | End: 2022-11-19
Attending: OBSTETRICS & GYNECOLOGY | Admitting: OBSTETRICS & GYNECOLOGY
Payer: COMMERCIAL

## 2022-11-18 DIAGNOSIS — Z90.710 HISTORY OF ROBOT-ASSISTED LAPAROSCOPIC HYSTERECTOMY: Primary | ICD-10-CM

## 2022-11-18 LAB
ABO + RH BLD: NORMAL
BLOOD GROUP ANTIBODIES SERPL: NEGATIVE
GLUCOSE BLD STRIP.AUTO-MCNC: 153 MG/DL (ref 70–110)
GLUCOSE BLD STRIP.AUTO-MCNC: 215 MG/DL (ref 70–110)
GLUCOSE BLD STRIP.AUTO-MCNC: 286 MG/DL (ref 70–110)
PERFORMED BY, TECHID: ABNORMAL
SPECIMEN EXP DATE BLD: NORMAL

## 2022-11-18 PROCEDURE — 77030002933 HC SUT MCRYL J&J -A: Performed by: OBSTETRICS & GYNECOLOGY

## 2022-11-18 PROCEDURE — 77030013079 HC BLNKT BAIR HGGR 3M -A: Performed by: NURSE ANESTHETIST, CERTIFIED REGISTERED

## 2022-11-18 PROCEDURE — 74011250637 HC RX REV CODE- 250/637: Performed by: OBSTETRICS & GYNECOLOGY

## 2022-11-18 PROCEDURE — 77030008596 HC TRCR ENDOSC AMR -B: Performed by: OBSTETRICS & GYNECOLOGY

## 2022-11-18 PROCEDURE — 82962 GLUCOSE BLOOD TEST: CPT

## 2022-11-18 PROCEDURE — 77030040174 HC ADH SKN AFFIX MDII -B: Performed by: OBSTETRICS & GYNECOLOGY

## 2022-11-18 PROCEDURE — 77030035026: Performed by: OBSTETRICS & GYNECOLOGY

## 2022-11-18 PROCEDURE — 77030016151 HC PROTCTR LNS DFOG COVD -B: Performed by: OBSTETRICS & GYNECOLOGY

## 2022-11-18 PROCEDURE — 77030008684 HC TU ET CUF COVD -B: Performed by: NURSE ANESTHETIST, CERTIFIED REGISTERED

## 2022-11-18 PROCEDURE — 86900 BLOOD TYPING SEROLOGIC ABO: CPT

## 2022-11-18 PROCEDURE — 76210000063 HC OR PH I REC FIRST 0.5 HR: Performed by: OBSTETRICS & GYNECOLOGY

## 2022-11-18 PROCEDURE — 74011250636 HC RX REV CODE- 250/636: Performed by: NURSE ANESTHETIST, CERTIFIED REGISTERED

## 2022-11-18 PROCEDURE — 77030020703 HC SEAL CANN DISP INTU -B: Performed by: OBSTETRICS & GYNECOLOGY

## 2022-11-18 PROCEDURE — 77030034188 HC DSCTR LAPSCP EPIX DISP AMR -B: Performed by: OBSTETRICS & GYNECOLOGY

## 2022-11-18 PROCEDURE — 74011000636 HC RX REV CODE- 636: Performed by: NURSE ANESTHETIST, CERTIFIED REGISTERED

## 2022-11-18 PROCEDURE — 77030014063 HC TIP MANIP UTER COOP -B: Performed by: OBSTETRICS & GYNECOLOGY

## 2022-11-18 PROCEDURE — 77030035277 HC OBTRTR BLDELSS DISP INTU -B: Performed by: OBSTETRICS & GYNECOLOGY

## 2022-11-18 PROCEDURE — 77030026438 HC STYL ET INTUB CARD -A: Performed by: NURSE ANESTHETIST, CERTIFIED REGISTERED

## 2022-11-18 PROCEDURE — 74011636637 HC RX REV CODE- 636/637: Performed by: NURSE PRACTITIONER

## 2022-11-18 PROCEDURE — 77030019927 HC TBNG IRR CYSTO BAXT -A: Performed by: OBSTETRICS & GYNECOLOGY

## 2022-11-18 PROCEDURE — 76010000876 HC OR TIME 2 TO 2.5HR INTENSV - TIER 2: Performed by: OBSTETRICS & GYNECOLOGY

## 2022-11-18 PROCEDURE — 36415 COLL VENOUS BLD VENIPUNCTURE: CPT

## 2022-11-18 PROCEDURE — 76060000035 HC ANESTHESIA 2 TO 2.5 HR: Performed by: OBSTETRICS & GYNECOLOGY

## 2022-11-18 PROCEDURE — 77030041629 HC TU INSUF ENDOSC DERY -A: Performed by: OBSTETRICS & GYNECOLOGY

## 2022-11-18 PROCEDURE — 88307 TISSUE EXAM BY PATHOLOGIST: CPT

## 2022-11-18 PROCEDURE — 77030022704 HC SUT VLOC COVD -B: Performed by: OBSTETRICS & GYNECOLOGY

## 2022-11-18 PROCEDURE — 74011000250 HC RX REV CODE- 250: Performed by: NURSE ANESTHETIST, CERTIFIED REGISTERED

## 2022-11-18 PROCEDURE — 77030041625: Performed by: OBSTETRICS & GYNECOLOGY

## 2022-11-18 PROCEDURE — 74011000250 HC RX REV CODE- 250: Performed by: OBSTETRICS & GYNECOLOGY

## 2022-11-18 PROCEDURE — C1765 ADHESION BARRIER: HCPCS | Performed by: OBSTETRICS & GYNECOLOGY

## 2022-11-18 PROCEDURE — 77030025805 HC MANIP UTER RUMI COOP -B: Performed by: OBSTETRICS & GYNECOLOGY

## 2022-11-18 PROCEDURE — 77030038612 HC TU SUCT/IRR INTU -D: Performed by: OBSTETRICS & GYNECOLOGY

## 2022-11-18 PROCEDURE — 77030018831 HC SOL IRR H20 BAXT -A: Performed by: OBSTETRICS & GYNECOLOGY

## 2022-11-18 PROCEDURE — 2709999900 HC NON-CHARGEABLE SUPPLY: Performed by: OBSTETRICS & GYNECOLOGY

## 2022-11-18 PROCEDURE — G0378 HOSPITAL OBSERVATION PER HR: HCPCS

## 2022-11-18 RX ORDER — OXYCODONE AND ACETAMINOPHEN 5; 325 MG/1; MG/1
2 TABLET ORAL
Status: DISCONTINUED | OUTPATIENT
Start: 2022-11-18 | End: 2022-11-19 | Stop reason: HOSPADM

## 2022-11-18 RX ORDER — PROPOFOL 10 MG/ML
INJECTION, EMULSION INTRAVENOUS AS NEEDED
Status: DISCONTINUED | OUTPATIENT
Start: 2022-11-18 | End: 2022-11-18 | Stop reason: HOSPADM

## 2022-11-18 RX ORDER — KETOROLAC TROMETHAMINE 30 MG/ML
INJECTION, SOLUTION INTRAMUSCULAR; INTRAVENOUS AS NEEDED
Status: DISCONTINUED | OUTPATIENT
Start: 2022-11-18 | End: 2022-11-18 | Stop reason: HOSPADM

## 2022-11-18 RX ORDER — ROCURONIUM BROMIDE 10 MG/ML
INJECTION, SOLUTION INTRAVENOUS AS NEEDED
Status: DISCONTINUED | OUTPATIENT
Start: 2022-11-18 | End: 2022-11-18 | Stop reason: HOSPADM

## 2022-11-18 RX ORDER — SODIUM CHLORIDE 0.9 % (FLUSH) 0.9 %
5-40 SYRINGE (ML) INJECTION EVERY 8 HOURS
Status: DISCONTINUED | OUTPATIENT
Start: 2022-11-18 | End: 2022-11-18 | Stop reason: HOSPADM

## 2022-11-18 RX ORDER — SODIUM CHLORIDE, SODIUM LACTATE, POTASSIUM CHLORIDE, CALCIUM CHLORIDE 600; 310; 30; 20 MG/100ML; MG/100ML; MG/100ML; MG/100ML
25 INJECTION, SOLUTION INTRAVENOUS CONTINUOUS
Status: DISCONTINUED | OUTPATIENT
Start: 2022-11-18 | End: 2022-11-18 | Stop reason: HOSPADM

## 2022-11-18 RX ORDER — MIDAZOLAM HYDROCHLORIDE 1 MG/ML
INJECTION, SOLUTION INTRAMUSCULAR; INTRAVENOUS AS NEEDED
Status: DISCONTINUED | OUTPATIENT
Start: 2022-11-18 | End: 2022-11-18 | Stop reason: HOSPADM

## 2022-11-18 RX ORDER — ONDANSETRON 2 MG/ML
4 INJECTION INTRAMUSCULAR; INTRAVENOUS ONCE
Status: DISCONTINUED | OUTPATIENT
Start: 2022-11-18 | End: 2022-11-18 | Stop reason: HOSPADM

## 2022-11-18 RX ORDER — IBUPROFEN 400 MG/1
400 TABLET ORAL
Status: DISCONTINUED | OUTPATIENT
Start: 2022-11-18 | End: 2022-11-19 | Stop reason: HOSPADM

## 2022-11-18 RX ORDER — METFORMIN HYDROCHLORIDE 500 MG/1
500 TABLET ORAL 2 TIMES DAILY WITH MEALS
Status: DISCONTINUED | OUTPATIENT
Start: 2022-11-18 | End: 2022-11-19 | Stop reason: HOSPADM

## 2022-11-18 RX ORDER — DEXAMETHASONE SODIUM PHOSPHATE 4 MG/ML
INJECTION, SOLUTION INTRA-ARTICULAR; INTRALESIONAL; INTRAMUSCULAR; INTRAVENOUS; SOFT TISSUE AS NEEDED
Status: DISCONTINUED | OUTPATIENT
Start: 2022-11-18 | End: 2022-11-18 | Stop reason: HOSPADM

## 2022-11-18 RX ORDER — ONDANSETRON 2 MG/ML
4 INJECTION INTRAMUSCULAR; INTRAVENOUS
Status: DISCONTINUED | OUTPATIENT
Start: 2022-11-18 | End: 2022-11-19 | Stop reason: HOSPADM

## 2022-11-18 RX ORDER — OXYCODONE AND ACETAMINOPHEN 5; 325 MG/1; MG/1
1 TABLET ORAL
Status: DISCONTINUED | OUTPATIENT
Start: 2022-11-18 | End: 2022-11-19 | Stop reason: HOSPADM

## 2022-11-18 RX ORDER — SODIUM CHLORIDE 0.9 % (FLUSH) 0.9 %
5-40 SYRINGE (ML) INJECTION AS NEEDED
Status: DISCONTINUED | OUTPATIENT
Start: 2022-11-18 | End: 2022-11-19 | Stop reason: HOSPADM

## 2022-11-18 RX ORDER — FENTANYL CITRATE 50 UG/ML
INJECTION, SOLUTION INTRAMUSCULAR; INTRAVENOUS AS NEEDED
Status: DISCONTINUED | OUTPATIENT
Start: 2022-11-18 | End: 2022-11-18 | Stop reason: HOSPADM

## 2022-11-18 RX ORDER — SODIUM CHLORIDE 0.9 % (FLUSH) 0.9 %
5-40 SYRINGE (ML) INJECTION EVERY 8 HOURS
Status: DISCONTINUED | OUTPATIENT
Start: 2022-11-18 | End: 2022-11-19 | Stop reason: HOSPADM

## 2022-11-18 RX ORDER — SODIUM CHLORIDE 0.9 % (FLUSH) 0.9 %
5-40 SYRINGE (ML) INJECTION AS NEEDED
Status: DISCONTINUED | OUTPATIENT
Start: 2022-11-18 | End: 2022-11-18 | Stop reason: HOSPADM

## 2022-11-18 RX ORDER — DICYCLOMINE HYDROCHLORIDE 10 MG/1
20 CAPSULE ORAL
Status: DISCONTINUED | OUTPATIENT
Start: 2022-11-18 | End: 2022-11-19 | Stop reason: HOSPADM

## 2022-11-18 RX ORDER — DIPHENHYDRAMINE HYDROCHLORIDE 50 MG/ML
12.5 INJECTION, SOLUTION INTRAMUSCULAR; INTRAVENOUS
Status: DISCONTINUED | OUTPATIENT
Start: 2022-11-18 | End: 2022-11-18 | Stop reason: HOSPADM

## 2022-11-18 RX ORDER — FLUMAZENIL 0.1 MG/ML
0.2 INJECTION INTRAVENOUS
Status: DISCONTINUED | OUTPATIENT
Start: 2022-11-18 | End: 2022-11-18 | Stop reason: HOSPADM

## 2022-11-18 RX ORDER — NEOSTIGMINE METHYLSULFATE 1 MG/ML
INJECTION, SOLUTION INTRAVENOUS AS NEEDED
Status: DISCONTINUED | OUTPATIENT
Start: 2022-11-18 | End: 2022-11-18 | Stop reason: HOSPADM

## 2022-11-18 RX ORDER — LIDOCAINE HYDROCHLORIDE 20 MG/ML
INJECTION, SOLUTION INFILTRATION; PERINEURAL AS NEEDED
Status: DISCONTINUED | OUTPATIENT
Start: 2022-11-18 | End: 2022-11-18 | Stop reason: HOSPADM

## 2022-11-18 RX ORDER — ONDANSETRON 2 MG/ML
INJECTION INTRAMUSCULAR; INTRAVENOUS AS NEEDED
Status: DISCONTINUED | OUTPATIENT
Start: 2022-11-18 | End: 2022-11-18 | Stop reason: HOSPADM

## 2022-11-18 RX ORDER — ALBUTEROL SULFATE 0.83 MG/ML
2.5 SOLUTION RESPIRATORY (INHALATION)
Status: DISCONTINUED | OUTPATIENT
Start: 2022-11-18 | End: 2022-11-18 | Stop reason: HOSPADM

## 2022-11-18 RX ORDER — LIDOCAINE HYDROCHLORIDE 20 MG/ML
JELLY TOPICAL AS NEEDED
Status: DISCONTINUED | OUTPATIENT
Start: 2022-11-18 | End: 2022-11-18 | Stop reason: HOSPADM

## 2022-11-18 RX ORDER — MAGNESIUM SULFATE 100 %
4 CRYSTALS MISCELLANEOUS AS NEEDED
Status: DISCONTINUED | OUTPATIENT
Start: 2022-11-18 | End: 2022-11-18 | Stop reason: HOSPADM

## 2022-11-18 RX ORDER — FENTANYL CITRATE 50 UG/ML
50 INJECTION, SOLUTION INTRAMUSCULAR; INTRAVENOUS
Status: DISCONTINUED | OUTPATIENT
Start: 2022-11-18 | End: 2022-11-18 | Stop reason: HOSPADM

## 2022-11-18 RX ORDER — DEXTROSE 50 % IN WATER (D50W) INTRAVENOUS SYRINGE
25-50 AS NEEDED
Status: DISCONTINUED | OUTPATIENT
Start: 2022-11-18 | End: 2022-11-18 | Stop reason: HOSPADM

## 2022-11-18 RX ORDER — NALOXONE HYDROCHLORIDE 0.4 MG/ML
0.4 INJECTION, SOLUTION INTRAMUSCULAR; INTRAVENOUS; SUBCUTANEOUS AS NEEDED
Status: DISCONTINUED | OUTPATIENT
Start: 2022-11-18 | End: 2022-11-19 | Stop reason: HOSPADM

## 2022-11-18 RX ORDER — GLYCOPYRROLATE 0.2 MG/ML
INJECTION INTRAMUSCULAR; INTRAVENOUS AS NEEDED
Status: DISCONTINUED | OUTPATIENT
Start: 2022-11-18 | End: 2022-11-18 | Stop reason: HOSPADM

## 2022-11-18 RX ORDER — MESALAMINE 1000 MG/1
1000 SUPPOSITORY RECTAL
Status: DISCONTINUED | OUTPATIENT
Start: 2022-11-18 | End: 2022-11-18

## 2022-11-18 RX ORDER — FENTANYL CITRATE 50 UG/ML
50 INJECTION, SOLUTION INTRAMUSCULAR; INTRAVENOUS AS NEEDED
Status: DISCONTINUED | OUTPATIENT
Start: 2022-11-18 | End: 2022-11-18 | Stop reason: HOSPADM

## 2022-11-18 RX ORDER — NALOXONE HYDROCHLORIDE 0.4 MG/ML
0.2 INJECTION, SOLUTION INTRAMUSCULAR; INTRAVENOUS; SUBCUTANEOUS AS NEEDED
Status: DISCONTINUED | OUTPATIENT
Start: 2022-11-18 | End: 2022-11-18 | Stop reason: HOSPADM

## 2022-11-18 RX ORDER — INSULIN LISPRO 100 [IU]/ML
INJECTION, SOLUTION INTRAVENOUS; SUBCUTANEOUS
Status: DISCONTINUED | OUTPATIENT
Start: 2022-11-18 | End: 2022-11-19 | Stop reason: HOSPADM

## 2022-11-18 RX ADMIN — OXYCODONE AND ACETAMINOPHEN 2 TABLET: 5; 325 TABLET ORAL at 19:49

## 2022-11-18 RX ADMIN — SODIUM CHLORIDE, POTASSIUM CHLORIDE, SODIUM LACTATE AND CALCIUM CHLORIDE 25 ML/HR: 600; 310; 30; 20 INJECTION, SOLUTION INTRAVENOUS at 07:48

## 2022-11-18 RX ADMIN — ROCURONIUM BROMIDE 20 MG: 50 INJECTION, SOLUTION INTRAVENOUS at 10:40

## 2022-11-18 RX ADMIN — FENTANYL CITRATE 50 MCG: 50 INJECTION, SOLUTION INTRAMUSCULAR; INTRAVENOUS at 12:48

## 2022-11-18 RX ADMIN — MIDAZOLAM HYDROCHLORIDE 2 MG: 2 INJECTION, SOLUTION INTRAMUSCULAR; INTRAVENOUS at 10:23

## 2022-11-18 RX ADMIN — GLYCOPYRROLATE 0.4 MG: 0.2 INJECTION INTRAMUSCULAR; INTRAVENOUS at 12:13

## 2022-11-18 RX ADMIN — OXYCODONE AND ACETAMINOPHEN 2 TABLET: 5; 325 TABLET ORAL at 14:35

## 2022-11-18 RX ADMIN — FENTANYL CITRATE 50 MCG: 50 INJECTION, SOLUTION INTRAMUSCULAR; INTRAVENOUS at 11:00

## 2022-11-18 RX ADMIN — OXYCODONE AND ACETAMINOPHEN 2 TABLET: 5; 325 TABLET ORAL at 23:52

## 2022-11-18 RX ADMIN — INSULIN LISPRO 6 UNITS: 100 INJECTION, SOLUTION INTRAVENOUS; SUBCUTANEOUS at 22:09

## 2022-11-18 RX ADMIN — PROPOFOL 200 MG: 10 INJECTION, EMULSION INTRAVENOUS at 10:25

## 2022-11-18 RX ADMIN — FENTANYL CITRATE 100 MCG: 50 INJECTION, SOLUTION INTRAMUSCULAR; INTRAVENOUS at 10:23

## 2022-11-18 RX ADMIN — LIDOCAINE HYDROCHLORIDE 100 MG: 20 INJECTION, SOLUTION INFILTRATION; PERINEURAL at 10:25

## 2022-11-18 RX ADMIN — SODIUM CHLORIDE, PRESERVATIVE FREE 10 ML: 5 INJECTION INTRAVENOUS at 23:52

## 2022-11-18 RX ADMIN — NEOSTIGMINE METHYLSULFATE 4 MG: 1 INJECTION, SOLUTION INTRAVENOUS at 12:13

## 2022-11-18 RX ADMIN — ONDANSETRON HYDROCHLORIDE 4 MG: 2 INJECTION, SOLUTION INTRAMUSCULAR; INTRAVENOUS at 10:23

## 2022-11-18 RX ADMIN — FENTANYL CITRATE 50 MCG: 50 INJECTION, SOLUTION INTRAMUSCULAR; INTRAVENOUS at 12:39

## 2022-11-18 RX ADMIN — KETOROLAC TROMETHAMINE 30 MG: 30 INJECTION, SOLUTION INTRAMUSCULAR at 10:25

## 2022-11-18 RX ADMIN — ROCURONIUM BROMIDE 50 MG: 50 INJECTION, SOLUTION INTRAVENOUS at 10:25

## 2022-11-18 RX ADMIN — METHYLENE BLUE 105 MG: 5 INJECTION INTRAVENOUS at 12:05

## 2022-11-18 RX ADMIN — DEXAMETHASONE SODIUM PHOSPHATE 10 MG: 4 INJECTION, SOLUTION INTRAMUSCULAR; INTRAVENOUS at 10:25

## 2022-11-18 RX ADMIN — FENTANYL CITRATE 50 MCG: 50 INJECTION, SOLUTION INTRAMUSCULAR; INTRAVENOUS at 10:38

## 2022-11-18 NOTE — OP NOTES
Operative Note    Patient: Jordy Bermudez  YOB: 1995  MRN: [id number]    Date of Procedure: 11/18/2022     Pre-Op Diagnosis: Endometriosis of uterus [N80.00]    Post-Op Diagnosis: Same as preoperative diagnosis. Procedure(s):  ROBOTIC TLH, SISI SALP, CYSTOSCOPY    Surgeon(s):  Fanny Sapp MD    Surgical Assistant: Surg Asst-1: Donis [place]    Anesthesia: General     Estimated Blood Loss (mL):  less than 50     Complications: None    Specimens:   ID Type Source Tests Collected by Time Destination   1 : Uterus & bilateral fallopian tubes Preservative Uterus  Fanny Sapp MD 11/18/2022 1140 Pathology        Implants: * No implants in log *    Drains: * No LDAs found *    Findings: Normal-appearing uterus lower segment of the uterus has adhesions to the anterior abdominal wall, adhesion from the omentum to the anterior abdominal wall, normal ovaries normal fallopian tubes    Detailed Description of Procedure:   TThe patient was seen in the Holding Room. The risks, benefits, complications, treatment options, and expected outcomes were discussed with the patient. The patient concurred with the proposed plan, giving informed consent. The site of surgery properly noted. The patient was taken to Operating Room, identified as Charlotte Chavez and the procedure verified as total laparoscopic hysterectomy b/l salpingectomy  using divinci robot , cystoscopy. A Time Out was held and the above information confirmed. After induction of anesthesia, the patient was draped and prepped in the usual sterile manner. Pt was placed in dorsal lithotomy position after anesthesia and draped and prepped in the usual sterile manner. Attention was turned to the vagina where the speculum was placed. Cervix was not identified initially and had to be palpated and than grasped using single tooth tenaculum , The uterus was sounded to 11cm and the tenaculum was placed on the anterior cervical lip. The colpotomy cup was placed and the manipulator was placed and inflated. The occlusion balloon was also inflated. Newton catheter was placed, and clear urine noted. A Midline incision 8mm was made  3 finger breadth above the umbilicus due to the position of the uterus , three other ports two on the right side and one left side of the midline port were placed under direct visualization. the robot was then docked after setting target organ on the scope through arm 3 of divinici       Upon initial entry adhesion was noticed from omentum to anterior abdominal wall , which was reduced vessel sealer device. The uterus was than noticed to be adherent to the abdominal wall , which was slowly dissected off from the anterior abdominal wall using a vessel sealer , with assistance by a fenestrated bipolar grasper , the lower extent of dissection was identified by utilizing 30 degree lens from the right side which was noticed to be free of adhesions . The left side was noticed to have more adhesions , which were dissected by transacting utero ovarian ligament and dissection continued towards round ligament which was than transacted and the broad ligament cavity was opened. uterine vessels isolated and bladder flap created . The right side  round ligaments were identified, cut, and ligated with the Vessel sealer. The right and left adnexae were identified and normal . The Vessel sealer device was used to cut through the broad ligament to the cardinal ligaments. The bladder flap was developed and instruments blunt end was used push the bladder off the coloptomy cup. Hemostasis was observed. The uterine vessels were skeletonized, and interrupted with the vessel sealer device. The vagina was then  from the cervix, using the monopolar hookcircumferentially around the cup. The uterus was then pulled through the vagina and used to keep adequate insufflation.   Vaginal cuff angle sutures were placed incorporating the utero-sacral ligaments for support,using 2-0 V-lock endostitch suture Hemostasis was again noted to be adequate. The left fallopian tube was than transacted and retrieved through the accesory port , similar procedure repeated on the contralater side and removed. Patient pressure was than dropped to 5mm and no active bleeding was noticed. the trocars were than removed. A cystoscopy was performed using 30 degree scope and reteric catherter was placed in each ureter since no jet flow was noticed after approximately 10 minutes of waiting , however the uretic cathers were easily pushed through without any resistance , the rest of bladder was visualized and noticed to be intact without any evidence of sutures in the bladder. The patient was cleaned and sterile dressings applied, sterile drapes removed, and placed in dorsal position. The patient was weaned from general anesthesia, extubated atraumatically, and taken to the recovery room in good condition. Instrument, sponge, and needle counts were correct prior to abdominal closure and at the conclusion of the case. At the completion , urine was noticed to be bluish in color due to metheline blue injected during cytoscopy     Disposition: PACU - hemodynamically stable.             Condition: stable    Attending Attestation: I was present and scrubbed for the entire procedure        Electronically Signed by Elin Jasso MD on 11/18/2022 at 12:25 PM

## 2022-11-18 NOTE — ANESTHESIA PREPROCEDURE EVALUATION
Relevant Problems   No relevant active problems       Anesthetic History   No history of anesthetic complications            Review of Systems / Medical History  Patient summary reviewed, nursing notes reviewed and pertinent labs reviewed    Pulmonary  Within defined limits                 Neuro/Psych   Within defined limits           Cardiovascular            Dysrhythmias : atrial fibrillation      Exercise tolerance: >4 METS     GI/Hepatic/Renal  Within defined limits              Endo/Other    Diabetes    Morbid obesity     Other Findings              Physical Exam    Airway  Mallampati: II  TM Distance: 4 - 6 cm  Neck ROM: normal range of motion   Mouth opening: Normal     Cardiovascular  Regular rate and rhythm,  S1 and S2 normal,  no murmur, click, rub, or gallop  Rhythm: regular  Rate: normal         Dental  No notable dental hx       Pulmonary  Breath sounds clear to auscultation               Abdominal  GI exam deferred       Other Findings            Anesthetic Plan    ASA: 2  Anesthesia type: general          Induction: Intravenous  Anesthetic plan and risks discussed with: Patient

## 2022-11-18 NOTE — BRIEF OP NOTE
Brief Postoperative Note    Patient: Tommy Marsh  YOB: 1995  MRN: [id number]    Date of Procedure: 11/18/2022     Pre-Op Diagnosis: Endometriosis of uterus [N80.00]    Post-Op Diagnosis: Same as preoperative diagnosis.       Procedure(s):  ROBOTIC TLH, SISI SALP, CYSTOSCOPY    Surgeon(s):  Lisa Sapp MD    Surgical Assistant: Surg Asst-1: Maricel Torres    Anesthesia: General     Estimated Blood Loss (mL): less than 50     Complications: None    Specimens:   ID Type Source Tests Collected by Time Destination   1 : Uterus & bilateral fallopian tubes Preservative Uterus  Lisa Sapp MD 11/18/2022 1140 Pathology        Implants: * No implants in log *    Drains: * No LDAs found *    Findings: normal uterus tubes and ovaries , adhesion from omentum to anterior abdominal wall , ahesions from lower segment to anterior wall     Electronically Signed by Juventino Metzger MD on 11/18/2022 at 12:24 PM

## 2022-11-18 NOTE — ANESTHESIA POSTPROCEDURE EVALUATION
Procedure(s):  ROBOTIC TLH, SISI SALP, CYSTOSCOPY. general    Anesthesia Post Evaluation      Multimodal analgesia: multimodal analgesia used between 6 hours prior to anesthesia start to PACU discharge  Patient location during evaluation: bedside  Patient participation: complete - patient cannot participate  Level of consciousness: awake and alert  Pain management: adequate  Airway patency: patent  Anesthetic complications: no  Cardiovascular status: stable  Respiratory status: acceptable  Hydration status: acceptable  Comments: DC when criteria met.   Post anesthesia nausea and vomiting:  none  Final Post Anesthesia Temperature Assessment:  Normothermia (36.0-37.5 degrees C)      INITIAL Post-op Vital signs:   Vitals Value Taken Time   /86 11/18/22 1233   Temp 36.5 °C (97.7 °F) 11/18/22 1233   Pulse 74 11/18/22 1233   Resp 16 11/18/22 1233   SpO2 98 % 11/18/22 1233

## 2022-11-18 NOTE — PROGRESS NOTES
1330- received patient to room 248 vss, no complaints at this    1438- prn percocet administered , family at bedside    [id number]- rounding on patient, no needs at this time

## 2022-11-18 NOTE — PROGRESS NOTES
Care Management Interventions  PCP Verified by CM: Yes  Palliative Care Criteria Met (RRAT>21 & CHF Dx)?: No  Transition of Care Consult (CM Consult): Discharge Planning  Physical Therapy Consult: No  Occupational Therapy Consult: No  Speech Therapy Consult: No  Support Systems: Spouse/Significant Other, Child(nieves), Parent(s)  Confirm Follow Up Transport: Family  The Plan for Transition of Care is Related to the Following Treatment Goals : Patient centered discharge planning to ensure smooth transition to community and PLOF. Discharge Location  Patient Expects to be Discharged to[de-identified] Home      Pt placed in OBS s/p robotic TLH, bilateral salp.and cystoscopy. Pt lives at home with significant other and is independent of ADLs, no DME use or needs. No HH needs. CM following for admission.

## 2022-11-18 NOTE — H&P
History and Physical    Subjective:     Jordy Bermudez is a 32 y.o. female with a past medical history significant for obesity, diabetes mellitus type 2, and menorrhagia/endometriosis who was status post robotic TLH bilateral salpingectomy, cystoscopy today by Dr. Khurram Matthew. Hospitalist was asked to admit for overnight observation, for pain control. Patient was examined at the bedside in company of her family member. She denies any chest pain, fever, chills, shortness of breath, nausea, vomiting, fatigue, or weakness. No acute complaints verbalized. Past Medical History:   Diagnosis Date    Atrial fibrillation ([facility] [address]     Diabetes ([facility] [address]       Past Surgical History:   Procedure Laterality Date    HX  SECTION N/A     x 2     History reviewed. No pertinent family history. Social History     Tobacco Use    Smoking status: Never    Smokeless tobacco: Never   Substance Use Topics    Alcohol use: Yes     Comment: occ       Prior to Admission medications    Medication Sig Start Date End Date Taking? Authorizing Provider   dapagliflozin (FARXIGA) 5 mg tab tablet Take 5 mg by mouth daily. Yes Provider, Historical   mesalamine (CANASA) 1,000 mg suppository mesalamine 1,000 mg rectal suppository   unwrap and insert 1 suppository rectally at bedtime   Yes Other, MD Jaida   furosemide (LASIX) 20 mg tablet furosemide 20 mg tablet   PRN   Yes Other, MD Jaida   dicyclomine (BENTYL) 20 mg tablet TAKE 1 TABLET 4 TIMES DAILY AS NEEDED FOR ABDOMINAL PAIN 22  Yes Other, MD Jaida   metFORMIN (GLUCOPHAGE) 500 mg tablet Take 500 mg by mouth two (2) times daily (with meals). Yes Other, MD Jaida     Allergies   Allergen Reactions    Doxycycline Nausea and Vomiting        Review of Systems:  Negative except as mentioned in HPI.        Objective:     Vitals:  Visit Vitals  /64   Pulse 68   Temp 96.9 °F (36.1 °C)   Resp 15   Wt 105 kg (231 lb 6.4 oz)   LMP 10/14/2022 Comment: BTL 2017   SpO2 100%   BMI 42.32 kg/m²       Physical Exam:  General: Morbidly obese young adult who appears stated age, appears awake, alert, oriented x 4, cooperative, in no acute distress. On room air. Head: Normocephalic, without obvious abnormality, atraumatic. Eyes: Conjunctivae/corneas clear. Pupils equal, round, reactive to light. Extraocular movements intact. No icterus. Lungs: Clear to auscultation bilaterally, no wheezes, no crackles. Chest wall: No tenderness or deformity. Heart: Regular rate and rhythm, S1, S2 normal, no murmur, click, rub, or gallop. Abdomen: obese, soft, 5 lap sites with approximate edges, soft, non-tender. Bowel sounds normal. No palpable masses. Extremities:Extremities normal, atraumatic, no cyanosis or peripheral edema. Pulses: 2+ and symmetric all extremities. Skin:not pale or jaundiced, overall skin color, texture, turgor normal.   Lymph nodes: Cervical nodes normal.  Neurologic: Awake and oriented, x 4. Non-focal.       Labs:  Recent Results (from the past 24 hour(s))   GLUCOSE, POC    Collection Time: 11/18/22  7:44 AM   Result Value Ref Range    Glucose (POC) 153 (H) 70 - 110 mg/dL    Performed by Brian Barron Dr    Collection Time: 11/18/22  8:00 AM   Result Value Ref Range    Crossmatch Expiration 11/21/2022,2359     ABO/Rh(D) O Positive     Antibody screen Negative        Imaging:  No results found. Assessment & Plan:     #1: Hx of menorrhagia, and endometriosis:, s/p  robotic TLH, Bilateral Salp, Cystoscopy today by Dr Sherri Duncan.  -pain control with percocett prn, antiemetics with zofran prn  -d/c in a.m. #2: Diabetes Mellitus Type 2:  -cont metformin and Farxiga at d/c  -accu-checks achs. SSI prn. #3: Ulcerative colitis:  -chronic issue, on mesalamine suppositories prn-      VTE prophylaxis: SCDs  Code status: Full code  Total time spent: 30 minutes  Plan of care discussed with patient and family at bedside.

## 2022-11-19 VITALS
WEIGHT: 231.4 LBS | RESPIRATION RATE: 18 BRPM | TEMPERATURE: 97 F | HEART RATE: 77 BPM | BODY MASS INDEX: 42.32 KG/M2 | OXYGEN SATURATION: 99 % | SYSTOLIC BLOOD PRESSURE: 125 MMHG | DIASTOLIC BLOOD PRESSURE: 53 MMHG

## 2022-11-19 LAB
ANION GAP SERPL CALC-SCNC: 9 MMOL/L (ref 3–18)
BUN SERPL-MCNC: 20 MG/DL (ref 7–18)
BUN/CREAT SERPL: 22 (ref 12–20)
CA-I BLD-MCNC: 8.8 MG/DL (ref 8.5–10.1)
CHLORIDE SERPL-SCNC: 101 MMOL/L (ref 100–111)
CO2 SERPL-SCNC: 28 MMOL/L (ref 21–32)
CREAT SERPL-MCNC: 0.91 MG/DL (ref 0.6–1.3)
ERYTHROCYTE [DISTWIDTH] IN BLOOD BY AUTOMATED COUNT: 12.9 % (ref 11.6–14.5)
GLUCOSE BLD STRIP.AUTO-MCNC: 173 MG/DL (ref 70–110)
GLUCOSE SERPL-MCNC: 207 MG/DL (ref 74–99)
HCT VFR BLD AUTO: 37.9 % (ref 35–45)
HGB BLD-MCNC: 12.1 G/DL (ref 12–16)
MCH RBC QN AUTO: 27.5 PG (ref 24–34)
MCHC RBC AUTO-ENTMCNC: 31.9 G/DL (ref 31–37)
MCV RBC AUTO: 86.1 FL (ref 78–100)
NRBC # BLD: 0 K/UL (ref 0–0.01)
NRBC BLD-RTO: 0 PER 100 WBC
PERFORMED BY, TECHID: ABNORMAL
PLATELET # BLD AUTO: 268 K/UL (ref 135–420)
PMV BLD AUTO: 9.6 FL (ref 9.2–11.8)
POTASSIUM SERPL-SCNC: 4.2 MMOL/L (ref 3.5–5.5)
RBC # BLD AUTO: 4.4 M/UL (ref 4.2–5.3)
SODIUM SERPL-SCNC: 138 MMOL/L (ref 136–145)
WBC # BLD AUTO: 9.6 K/UL (ref 4.6–13.2)

## 2022-11-19 PROCEDURE — 74011636637 HC RX REV CODE- 636/637: Performed by: NURSE PRACTITIONER

## 2022-11-19 PROCEDURE — 82962 GLUCOSE BLOOD TEST: CPT

## 2022-11-19 PROCEDURE — 80048 BASIC METABOLIC PNL TOTAL CA: CPT

## 2022-11-19 PROCEDURE — G0378 HOSPITAL OBSERVATION PER HR: HCPCS

## 2022-11-19 PROCEDURE — 36415 COLL VENOUS BLD VENIPUNCTURE: CPT

## 2022-11-19 PROCEDURE — 74011000250 HC RX REV CODE- 250: Performed by: OBSTETRICS & GYNECOLOGY

## 2022-11-19 PROCEDURE — 85027 COMPLETE CBC AUTOMATED: CPT

## 2022-11-19 PROCEDURE — 74011250637 HC RX REV CODE- 250/637: Performed by: OBSTETRICS & GYNECOLOGY

## 2022-11-19 RX ORDER — OXYCODONE AND ACETAMINOPHEN 5; 325 MG/1; MG/1
1 TABLET ORAL
Qty: 20 TABLET | Refills: 0 | Status: SHIPPED | OUTPATIENT
Start: 2022-11-19 | End: 2022-11-24

## 2022-11-19 RX ADMIN — SODIUM CHLORIDE, PRESERVATIVE FREE 10 ML: 5 INJECTION INTRAVENOUS at 05:39

## 2022-11-19 RX ADMIN — OXYCODONE AND ACETAMINOPHEN 2 TABLET: 5; 325 TABLET ORAL at 06:18

## 2022-11-19 RX ADMIN — INSULIN LISPRO 2 UNITS: 100 INJECTION, SOLUTION INTRAVENOUS; SUBCUTANEOUS at 08:16

## 2022-11-19 NOTE — DISCHARGE SUMMARY
Discharge Summary       PATIENT ID: Gayatri Luque  MRN: [id number]   YOB: 1995    DATE OF ADMISSION: 11/18/2022  7:24 AM    DATE OF DISCHARGE: 11/19/22    PRIMARY CARE PROVIDER: Taylor Garner MD     ATTENDING PHYSICIAN: Dede Rondon MD  DISCHARGING PROVIDER: Dede Rondon MD        CONSULTATIONS: None    PROCEDURES/SURGERIES: Procedure(s):  ROBOTIC TLH, SISI SALP, CYSTOSCOPY    ADMITTING [address] [place] [address] COURSE:   Gayatri Luque is a 32 y.o. female with a past medical history significant for obesity, diabetes mellitus type 2, and menorrhagia/endometriosis who was status post robotic TLH bilateral salpingectomy, cystoscopy today by Dr. Mraina Dickens. Hospitalist was asked to admit for overnight observation, for pain control. Patient was examined at the bedside in company of her family member. She denies any chest pain, fever, chills, shortness of breath, nausea, vomiting, fatigue, or weakness. No acute complaints verbalized. DISCHARGE DIAGNOSES / PLAN:      Assessment & Plan:      #1: Hx of menorrhagia, and endometriosis:, s/p  robotic TLH, Bilateral Salp, Cystoscopy today by Dr Sherri Dickens.  -pain control with percocett prn, antiemetics with zofran prn  -d/c in a.m. #2: Diabetes Mellitus Type 2:  -cont metformin and Farxiga at d/c  -accu-checks achs. SSI prn.       #3: Ulcerative colitis:  -chronic issue, on mesalamine suppositories prn-    Ok for dc home later today if feeling well and pain controlled  Wound care and restritions per Dr Marina Dickens  Will send percocet to pharmacy    FOLLOW UP APPOINTMENTS:    Follow-up Information       Follow up With Specialties Details Why Contact Info    Taylor Garner MD [facility]   [place] [address]  [address]  [phone]                 DIET: Regular Diet        DISCHARGE MEDICATIONS:  Current Discharge Medication List        START taking these medications    Details   oxyCODONE-acetaminophen (Percocet) 5-325 mg per tablet Take 1 Tablet by mouth every four (4) hours as needed for Pain for up to 5 days. Max Daily Amount: 6 Tablets. Qty: 20 Tablet, Refills: 0  Start date: 11/19/2022, End date: 11/24/2022    Associated Diagnoses: History of robot-assisted laparoscopic hysterectomy           CONTINUE these medications which have NOT CHANGED    Details   dapagliflozin (FARXIGA) 5 mg tab tablet Take 5 mg by mouth daily. mesalamine (CANASA) 1,000 mg suppository mesalamine 1,000 mg rectal suppository   unwrap and insert 1 suppository rectally at bedtime      furosemide (LASIX) 20 mg tablet furosemide 20 mg tablet   PRN      dicyclomine (BENTYL) 20 mg tablet TAKE 1 TABLET 4 TIMES DAILY AS NEEDED FOR ABDOMINAL PAIN      metFORMIN (GLUCOPHAGE) 500 mg tablet Take 500 mg by mouth two (2) times daily (with meals). NOTIFY YOUR PHYSICIAN FOR ANY OF THE FOLLOWING:   Fever over 101 degrees for 24 hours. Chest pain, shortness of breath, fever, chills, nausea, vomiting, diarrhea, change in mentation, falling, weakness, bleeding. Severe pain or pain not relieved by medications. Or, any other signs or symptoms that you may have questions about. DISPOSITION:home    Home With:   OT  PT  HH  RN       Long term SNF/Inpatient Rehab    Independent/assisted living    Hospice    Other:       PATIENT CONDITION AT DISCHARGE:     Functional status    Poor     Deconditioned    x Independent      Cognition    x Lucid     Forgetful     Dementia      Catheters/lines (plus indication)    Newton     PICC     PEG    x None      Code status   x  Full code     DNR      PHYSICAL EXAMINATION AT DISCHARGE:  General:          Alert, cooperative, no distress, appears stated age. HEENT:           Atraumatic, anicteric sclerae, pink conjunctivae                          No oral ulcers, mucosa moist, throat clear, dentition fair  Neck:               Supple, symmetrical  Lungs:             Clear to auscultation bilaterally.   No Wheezing or Rhonchi. No rales. Chest wall:      No tenderness  No Accessory muscle use. Heart:              Regular  rhythm,  No  murmur   No edema  Abdomen:        Soft, appropriately-tender. Not distended. Bowel sounds normal  Extremities:     No cyanosis. No clubbing,                            Skin turgor normal, Capillary refill normal  Skin:                Not pale. Not Jaundiced  No rashes   Psych:             Not anxious or agitated.   Neurologic:      Alert, moves all extremities, answers questions appropriately and responds to commands       CHRONIC MEDICAL DIAGNOSES:  Problem List as of 11/19/2022 Never Reviewed            Codes Class Noted - Resolved    History of robot-assisted laparoscopic hysterectomy ICD-10-CM: Z90.710  ICD-9-CM: V15.29  11/18/2022 - Present           Greater than 35 minutes were spent with the patient on counseling and coordination of care    Signed:   Shameka Cardenas MD  11/19/2022  10:02 AM

## 2022-11-19 NOTE — PROGRESS NOTES
0700- Received care of pt. Vitals obtained. Glucose checked. 1015- Pt paperwork complete and signed. Pt educated and being discharged at this time. IV removed and belongings have been gathered.

## 2022-11-19 NOTE — PROGRESS NOTES
1850 - shift change report received from Mohit 9 signs assessed. PRN pain medication administered for 7/10 incisional pain. Stat lock placed to adhere lion to patient's thigh. 2209 - 6 units SSI administered for POC . Ice water provided. Lion emptied of 900 ml cloudy, green urine. Call light in reach.      2348 - Vit

## 2022-11-21 ENCOUNTER — PATIENT OUTREACH (OUTPATIENT)
Dept: OTHER | Age: 27
End: 2022-11-21

## 2022-11-21 NOTE — PROGRESS NOTES
HPRR progress note    Patient eligible for [facility] care management  Discussed the care management program.  Patient agrees to care management services at this time. Pt was admitted to [facility] 11/18/22. Admission dates: 11/18/22 - 11/19/22  PROCEDURES/SURGERIES: Procedure(s):  ROBOTIC TLH, SISI SALP, CYSTOSCOPY    [facility] employee  Pt reported she is doing ok, but does report post-op pain 7/10 on pain scale without pain med. Denies s/s of infection. Patient denies C/P, SOB, cough, wheezing, fever, pain/swelling of legs or feet, N/V, diarrhea, difficulty urinating or constipation. BM today, denies blood. Appetite/hydration good. BS - didn't check today, but did check yesterday and it was 202.  Advised to check at least BID    Lab Results   Component Value Date/Time    Hemoglobin A1c, External 7.9 05/13/2022 12:00 AM           PMH:   Past Medical History:   Diagnosis Date    Atrial fibrillation (HCC)     Diabetes ([facility] [address]        Social History:   Social History     Socioeconomic History    Marital status: SINGLE     Spouse name: Not on file    Number of children: Not on file    Years of education: Not on file    Highest education level: Not on file   Occupational History    Not on file   Tobacco Use    Smoking status: Never    Smokeless tobacco: Never   Vaping Use    Vaping Use: Never used   Substance and Sexual Activity    Alcohol use: Yes     Comment: occ    Drug use: Never    Sexual activity: Not on file   Other Topics Concern    Not on file   Social History Narrative    Not on file     Social Determinants of Health     Financial Resource Strain: Not on file   Food Insecurity: Not on file   Transportation Needs: Not on file   Physical Activity: Not on file   Stress: Not on file   Social Connections: Not on file   Intimate Partner Violence: Not on file   Housing Stability: Not on file         Patient's primary care provider relationship reviewed with patient and modified, as applicable. Care management assessment completed:    Condition Focused Assessment: Surgical/Wound Condition Focused Assessment    Skin- any open wounds or incisions? yes  Description and location of wound- abdominal lap sites  In the last 24 hour have you experienced; Fever no    Low body temperature no    Chills or shaking no    Sweating no    Fast heart rate no    Fast breathing no    Dizziness/lightheadedness no    Confusion or unusual change in mental status no    Diarrhea no    Nausea no    Vomiting no    Shortness of breath or difficulty breathing no      Less urine output no    Cold, clammy, and pale skin no     Skin rash or skin color changes no  New or worsening pain? yes  If yes, pain rated 0-10: 7 Location/pain characteristics: surgical site   New or worsening numbness or tingling? no  If yes, location of numbness and tingling: n/a    Patient reported important numbers to know:  Temperature 97   Heart rate 77   Last /53   Weight 231     Activity level- moving several times a day, or as recommended? yes  Abnormal activity level reported: yes   Bathing and showering instructions? yes  Nutrition- prescribed diet? yes   Tolerating food? yes  Hydration- (how much in ounces per day) >16 oz  Medications- new antibiotic? no             Pain medication? yes  Does patient understand how and when to take their medications? yes  If no, instructed patient on proper medication use? yes  Does patient have incentive spirometer? no  If yes, how frequently is patient using incentive spirometer? Deep breath cough exercises       Medications:  New Medications at Discharge: oxyCODONE-acetaminophen (Percocet)  Changed Medications at Discharge: no  Discontinued Medications at Discharge: no  Current Outpatient Medications   Medication Sig    oxyCODONE-acetaminophen (Percocet) 5-325 mg per tablet Take 1 Tablet by mouth every four (4) hours as needed for Pain for up to 5 days. Max Daily Amount: 6 Tablets. dapagliflozin (FARXIGA) 5 mg tab tablet Take 5 mg by mouth daily. mesalamine (CANASA) 1,000 mg suppository mesalamine 1,000 mg rectal suppository   unwrap and insert 1 suppository rectally at bedtime    furosemide (LASIX) 20 mg tablet furosemide 20 mg tablet   PRN    dicyclomine (BENTYL) 20 mg tablet TAKE 1 TABLET 4 TIMES DAILY AS NEEDED FOR ABDOMINAL PAIN    metFORMIN (GLUCOPHAGE) 500 mg tablet Take 500 mg by mouth two (2) times daily (with meals). No current facility-administered medications for this visit. There are no discontinued medications. Performed medication reconciliation with patient, and patient verbalizes understanding of administration of home medications. There were no barriers to obtaining medications identified at this time. Preventive Care     Health Maintenance   Topic Date Due    Hepatitis C Screening  Never done    Depression Screen  Never done    COVID-19 Vaccine (1) Never done    Pap Smear  Never done    DTaP/Tdap/Td series (2 - Td or Tdap) 03/30/2027    Flu Vaccine  Completed    Pneumococcal 0-64 years  Aged Out       Healthy Habits    Nutrition: well balanced diabetic diet    Movement: every 1-2 hours     Goals        Attends follow-up appointments as directed. PCP - TBD  Surgeon - 12/1/22  RTW - TBD         Knowledge and adherence of prescribed medication (ie. action, side effects, missed dose, etc.). Taking prescribed meds       Patient verbalizes understanding of self -management goals of living with Diabetes. BS - 202  Encouraged to check BS at least BID    Lab Results   Component Value Date/Time    Hemoglobin A1c, External 7.9 05/13/2022 12:00 AM   Be Well with diabetes program - enrolled        Understands red flags post discharge.       CP, SOB, cough, wheeze, temp >100, increase in pain               Barriers/Support system:  patient and boyfriend      Barriers/Challenges to Care: []  Decline in memory    []  Language barrier     []  Emotional []  Limited mobility  []  Lack of motivation     [] Vision, hearing or cognitive impairment []  Knowledge [] Financial Barriers []  Lack of support  [x]  Pain [x]  Other     PCP/Specialist follow up:   No future appointments. PCP - TBD  Surgeon - 12/1/22     Reviewed red flags with patient, and patient verbalizes understanding. Patient given an opportunity to ask questions. No other clinical/social/functional needs noted. The patient agrees to contact the PCP office for questions related to their healthcare. The patient expressed thanks, offered no additional questions and ended the call.       Plan for next call: 1 week

## 2022-12-06 ENCOUNTER — PATIENT OUTREACH (OUTPATIENT)
Dept: OTHER | Age: 27
End: 2022-12-06

## 2022-12-06 NOTE — PROGRESS NOTES
HPRP Outreach    Call placed to patient, no answer. Voicemail left to return call. Purpose of TC    Pt was admitted to [facility] 11/18/22. Admission dates: 11/18/22 - 11/19/22  PROCEDURES/SURGERIES: Procedure(s):  ROBOTIC TLH, SISI SALP, CYSTOSCOPY    DM2     TC details    Call placed to patient, no answer. Voicemail left to return call. Plan of action  Provide support to patient. CM will follow-up in 1 week      Goals        Attends follow-up appointments as directed. PCP - TBD  Surgeon - 12/1/22  RTW - TBD    12/6/22 Call placed to patient, no answer. Voicemail left to return call. Knowledge and adherence of prescribed medication (ie. action, side effects, missed dose, etc.). Taking prescribed meds       Patient verbalizes understanding of self -management goals of living with Diabetes. BS - 202  Encouraged to check BS at least BID    Lab Results   Component Value Date/Time    Hemoglobin A1c, External 7.9 05/13/2022 12:00 AM   Be Well with diabetes program - enrolled        Understands red flags post discharge.       CP, SOB, cough, wheeze, temp >100, increase in pain

## 2022-12-12 ENCOUNTER — TELEPHONE (OUTPATIENT)
Dept: PHARMACY | Age: 27
End: 2022-12-12

## 2022-12-12 NOTE — TELEPHONE ENCOUNTER
[facility]    Courtney Collazo is a 32 y.o. female enrolled in the [facility] with Diabetes Program. The goal of this voluntary program is to help employees and covered dependents reach their health maintenance goals in regards to their diabetes diagnosis. According to our records, patient is missing the following requirement(s) that must be completed by December 31, 2022 to avoid discharge from the program:      Second A1c result in 2022       Plan:    Called Dr. Van Schlatter office. Spoke with Felipe who stated last A1c was on 5/13 which is already documented as her first of this year. Attempt made to reach patient by telephone to review above. Left voice message for patient to return clinician's phone call to [phone], option 3. MyChart message sent.     [place] [address]   Direct: [phone]     For Pharmacy Admin Tracking Only    CPA in place: No  Gap Closed?: No  Time Spent (min): 15

## 2022-12-12 NOTE — LETTER
[address] [facility] [address]  [address], [address]        [address] [facility] [id number] [address]           12/14/22     Dear Alan Mello,    You are currently enrolled in the Be Well with Diabetes Program. Our records indicate that we are missing the requirements listed below. If these requirements are not completed by December 31, 2022, then you may be disenrolled from the program:       Second A1c result in 2022      You will have to submit documentation of completion of requirements if your Physician does not use the [facility] electronic charting system or if you have your lab test done outside of [facility]. Return the documentation to [email or web address] or by fax at [phone]     If requirements(s) are not met by December 31st 2022 you will be dis-enrolled from the program and the credit valued at up to $600 towards your diabetic medications and supplies will be revoked. You will be able to reapply the following calendar year.     Please call our office so we can discuss the missing requirements with you to ensure that you will remain enrolled in the 2023 Be Well with Diabetes Program.     Thank you,     Angela [employer]   Phone: toll free [phone], Option #3  Email: [email or web address]   Fax Number: [phone]

## 2022-12-14 ENCOUNTER — PATIENT OUTREACH (OUTPATIENT)
Dept: OTHER | Age: 27
End: 2022-12-14

## 2022-12-14 NOTE — PROGRESS NOTES
HPRP Outreach     Call placed to patient, Verified  and address for HIPAA security. Purpose of TC     Pt was admitted to [facility] 22. Admission dates: 22 - 22  PROCEDURES/SURGERIES: Procedure(s):  ROBOTIC TLH, SISI SALP, CYSTOSCOPY     DM2     TC details     Pt reported now is not a good time and requested a call back at a later time. Plan of action  Provide support to patient. CM will follow-up in 2 week      Goals        Attends follow-up appointments as directed. PCP - TBD  Surgeon - 22  RTW - TBD    22 Call placed to patient, no answer. Voicemail left to return call. 22 Pt reported now is not a good time and requested a call back at a later time. Knowledge and adherence of prescribed medication (ie. action, side effects, missed dose, etc.). Taking prescribed meds       Patient verbalizes understanding of self -management goals of living with Diabetes. BS - 202  Encouraged to check BS at least BID    Lab Results   Component Value Date/Time    Hemoglobin A1c, External 7.9 2022 12:00 AM   Be Well with diabetes program - enrolled        Understands red flags post discharge.       CP, SOB, cough, wheeze, temp >100, increase in pain

## 2022-12-16 LAB — HBA1C MFR BLD HPLC: 7.3 %

## 2023-01-12 ENCOUNTER — PATIENT OUTREACH (OUTPATIENT)
Dept: OTHER | Age: 28
End: 2023-01-12

## 2023-01-12 NOTE — PROGRESS NOTES
HPRP Outreach     Call placed to patient, no answer. Voicemail left to return call. Purpose of TC     Pt was admitted to [facility] 11/18/22. Admission dates: 11/18/22 - 11/19/22  PROCEDURES/SURGERIES: Procedure(s):  ROBOTIC TLH, SISI SALP, CYSTOSCOPY     DM2     TC details     Call placed to patient, no answer. Voicemail left to return call. Plan of action  Provide support to patient.   CM will follow-up in 3 week

## 2023-01-23 ENCOUNTER — TELEPHONE (OUTPATIENT)
Dept: PHARMACY | Age: 28
End: 2023-01-23

## 2023-01-23 NOTE — TELEPHONE ENCOUNTER
2023 Annual Pharmacist Visit    **Patient is [facility]**   Called patient to schedule 2023 yearly pharmacist appointment to discuss medications for Diabetes Management Program.    No answer. Left VM on TAD: Please call back at [phone] Option #3.     [facility]   Department, toll free: [phone] Option #3

## 2023-01-30 NOTE — TELEPHONE ENCOUNTER
Second attempt made to contact patient to schedule 2023 yearly pharmacist appointment to discuss medications for Diabetes Management Program.    No answer. Left VM on  TAD: Please call back at [phone] Option #3. [employer] message sent to patient.       Lilli Lewis   Department, toll free: [phone] Option #3      For Pharmacy Admin Tracking Only    Program: [address]: No  Gap Closed?: No  Time Spent (min): 15

## 2023-02-09 ENCOUNTER — PATIENT OUTREACH (OUTPATIENT)
Dept: OTHER | Age: 28
End: 2023-02-09

## 2023-02-09 NOTE — PROGRESS NOTES
HPRP Outreach     Call placed to patient, Verified  and address for HIPAA security. Purpose of TC     Pt was admitted to [facility] 22. Admission dates: 22 - 22  PROCEDURES/SURGERIES: Procedure(s):  ROBOTIC TLH, SISI SALP, CYSTOSCOPY     DM2     TC details     Pt requested to call CM back at a later time. Plan of action  Provide support to patient.   CM will follow-up in 3 week

## 2023-02-13 ENCOUNTER — TELEPHONE (OUTPATIENT)
Dept: PHARMACY | Age: 28
End: 2023-02-13

## 2023-02-13 NOTE — TELEPHONE ENCOUNTER
2023 Annual Pharmacist Visit    **Patient is [facility]**   Called patient to schedule 2023 yearly pharmacist appointment to discuss medications for Diabetes Management Program.    No answer. Left VM on TAD: Please call back at [phone] Option #3.      [facility]   Department, toll free: [phone] Option #3

## 2023-03-02 NOTE — TELEPHONE ENCOUNTER
Patient returned call, scheduled 2023 Annual Pharmacist Visit 03.06.23 [address] Only    Program: [address]: No  Recommendation Provided To: Patient/Caregiver: 1 via Telephone  Intervention Detail: Scheduled Appointment  Intervention Accepted By: Patient/Caregiver: 1  Gap Closed?: Yes  Time Spent (min): 10

## 2023-03-03 ENCOUNTER — HOSPITAL ENCOUNTER (EMERGENCY)
Age: 28
Discharge: HOME | End: 2023-03-03
Attending: FAMILY MEDICINE
Payer: COMMERCIAL

## 2023-03-03 ENCOUNTER — TELEPHONE (OUTPATIENT)
Dept: PHARMACY | Facility: CLINIC | Age: 28
End: 2023-03-03

## 2023-03-03 VITALS
SYSTOLIC BLOOD PRESSURE: 117 MMHG | WEIGHT: 230 LBS | OXYGEN SATURATION: 98 % | HEIGHT: 62 IN | HEART RATE: 100 BPM | TEMPERATURE: 98 F | DIASTOLIC BLOOD PRESSURE: 70 MMHG | BODY MASS INDEX: 42.33 KG/M2 | RESPIRATION RATE: 16 BRPM

## 2023-03-03 DIAGNOSIS — R11.0 NAUSEA: Primary | ICD-10-CM

## 2023-03-03 DIAGNOSIS — B34.9 VIRAL ILLNESS: ICD-10-CM

## 2023-03-03 LAB
ALBUMIN SERPL-MCNC: 2.9 G/DL (ref 3.4–5)
ALBUMIN/GLOB SERPL: 0.7 (ref 0.8–1.7)
ALP SERPL-CCNC: 110 U/L (ref 45–117)
ALT SERPL-CCNC: 100 U/L (ref 13–56)
ANION GAP SERPL CALC-SCNC: 8 MMOL/L (ref 3–18)
APPEARANCE UR: CLEAR
AST SERPL W P-5'-P-CCNC: 41 U/L (ref 10–38)
BASOPHILS # BLD: 0.1 K/UL (ref 0–0.1)
BASOPHILS NFR BLD: 1 % (ref 0–2)
BILIRUB SERPL-MCNC: 0.2 MG/DL (ref 0.2–1)
BILIRUB UR QL: NEGATIVE
BUN SERPL-MCNC: 12 MG/DL (ref 7–18)
BUN/CREAT SERPL: 13 (ref 12–20)
CA-I BLD-MCNC: 8.6 MG/DL (ref 8.5–10.1)
CHLORIDE SERPL-SCNC: 104 MMOL/L (ref 100–111)
CO2 SERPL-SCNC: 27 MMOL/L (ref 21–32)
COLOR UR: YELLOW
CREAT SERPL-MCNC: 0.89 MG/DL (ref 0.6–1.3)
DIFFERENTIAL METHOD BLD: NORMAL
EOSINOPHIL # BLD: 0.2 K/UL (ref 0–0.4)
EOSINOPHIL NFR BLD: 2 % (ref 0–5)
ERYTHROCYTE [DISTWIDTH] IN BLOOD BY AUTOMATED COUNT: 12.1 % (ref 11.6–14.5)
FLUAV AG NPH QL IA: NEGATIVE
FLUBV AG NOSE QL IA: NEGATIVE
GLOBULIN SER CALC-MCNC: 3.9 G/DL (ref 2–4)
GLUCOSE BLD STRIP.AUTO-MCNC: 221 MG/DL (ref 70–110)
GLUCOSE SERPL-MCNC: 230 MG/DL (ref 74–99)
GLUCOSE UR STRIP.AUTO-MCNC: >1000 MG/DL
HCT VFR BLD AUTO: 43.4 % (ref 35–45)
HGB BLD-MCNC: 14.2 G/DL (ref 12–16)
HGB UR QL STRIP: NEGATIVE
IMM GRANULOCYTES # BLD AUTO: 0 K/UL (ref 0–0.04)
IMM GRANULOCYTES NFR BLD AUTO: 0 % (ref 0–0.5)
KETONES UR QL STRIP.AUTO: NEGATIVE MG/DL
LEUKOCYTE ESTERASE UR QL STRIP.AUTO: NEGATIVE
LIPASE SERPL-CCNC: 99 U/L (ref 73–393)
LYMPHOCYTES # BLD: 3.1 K/UL (ref 0.9–3.6)
LYMPHOCYTES NFR BLD: 41 % (ref 21–52)
MAGNESIUM SERPL-MCNC: 1.8 MG/DL (ref 1.6–2.6)
MCH RBC QN AUTO: 27.7 PG (ref 24–34)
MCHC RBC AUTO-ENTMCNC: 32.7 G/DL (ref 31–37)
MCV RBC AUTO: 84.8 FL (ref 78–100)
MONOCYTES # BLD: 0.4 K/UL (ref 0.05–1.2)
MONOCYTES NFR BLD: 6 % (ref 3–10)
NEUTS SEG # BLD: 3.8 K/UL (ref 1.8–8)
NEUTS SEG NFR BLD: 50 % (ref 40–73)
NITRITE UR QL STRIP.AUTO: NEGATIVE
NRBC # BLD: 0 K/UL (ref 0–0.01)
NRBC BLD-RTO: 0 PER 100 WBC
PERFORMED BY:: ABNORMAL
PH UR STRIP: 6.5 (ref 5–8)
PLATELET # BLD AUTO: 233 K/UL (ref 135–420)
PMV BLD AUTO: 11.1 FL (ref 9.2–11.8)
POTASSIUM SERPL-SCNC: 3.9 MMOL/L (ref 3.5–5.5)
PROT SERPL-MCNC: 6.8 G/DL (ref 6.4–8.2)
PROT UR STRIP-MCNC: NEGATIVE MG/DL
RBC # BLD AUTO: 5.12 M/UL (ref 4.2–5.3)
SODIUM SERPL-SCNC: 139 MMOL/L (ref 136–145)
SP GR UR REFRACTOMETRY: >1.03 (ref 1–1.03)
UROBILINOGEN UR QL STRIP.AUTO: 0.2 EU/DL (ref 0.2–1)
WBC # BLD AUTO: 7.5 K/UL (ref 4.6–13.2)

## 2023-03-03 PROCEDURE — 83690 ASSAY OF LIPASE: CPT

## 2023-03-03 PROCEDURE — 96361 HYDRATE IV INFUSION ADD-ON: CPT

## 2023-03-03 PROCEDURE — 81003 URINALYSIS AUTO W/O SCOPE: CPT

## 2023-03-03 PROCEDURE — 85025 COMPLETE CBC W/AUTO DIFF WBC: CPT

## 2023-03-03 PROCEDURE — 99284 EMERGENCY DEPT VISIT MOD MDM: CPT

## 2023-03-03 PROCEDURE — 80053 COMPREHEN METABOLIC PANEL: CPT

## 2023-03-03 PROCEDURE — 96374 THER/PROPH/DIAG INJ IV PUSH: CPT

## 2023-03-03 PROCEDURE — 2580000003 HC RX 258: Performed by: FAMILY MEDICINE

## 2023-03-03 PROCEDURE — 82962 GLUCOSE BLOOD TEST: CPT

## 2023-03-03 PROCEDURE — 36415 COLL VENOUS BLD VENIPUNCTURE: CPT

## 2023-03-03 PROCEDURE — 6360000002 HC RX W HCPCS: Performed by: FAMILY MEDICINE

## 2023-03-03 PROCEDURE — 87804 INFLUENZA ASSAY W/OPTIC: CPT

## 2023-03-03 PROCEDURE — 83735 ASSAY OF MAGNESIUM: CPT

## 2023-03-03 RX ORDER — 0.9 % SODIUM CHLORIDE 0.9 %
1000 INTRAVENOUS SOLUTION INTRAVENOUS ONCE
Status: DISCONTINUED | OUTPATIENT
Start: 2023-03-03 | End: 2023-03-03

## 2023-03-03 RX ORDER — METFORMIN HYDROCHLORIDE 500 MG/5ML
500 SOLUTION ORAL 2 TIMES DAILY WITH MEALS
COMMUNITY

## 2023-03-03 RX ORDER — METOCLOPRAMIDE 10 MG/1
10 TABLET ORAL 3 TIMES DAILY PRN
Qty: 30 TABLET | Refills: 0 | Status: SHIPPED | OUTPATIENT
Start: 2023-03-03 | End: 2023-03-03

## 2023-03-03 RX ORDER — MESALAMINE 1000 MG/1
SUPPOSITORY RECTAL
COMMUNITY

## 2023-03-03 RX ORDER — 0.9 % SODIUM CHLORIDE 0.9 %
1000 INTRAVENOUS SOLUTION INTRAVENOUS ONCE
Status: COMPLETED | OUTPATIENT
Start: 2023-03-03 | End: 2023-03-03

## 2023-03-03 RX ORDER — ONDANSETRON 2 MG/ML
4 INJECTION INTRAMUSCULAR; INTRAVENOUS ONCE
Status: COMPLETED | OUTPATIENT
Start: 2023-03-03 | End: 2023-03-03

## 2023-03-03 RX ORDER — ONDANSETRON 4 MG/1
4 TABLET, ORALLY DISINTEGRATING ORAL 3 TIMES DAILY PRN
Qty: 21 TABLET | Refills: 0 | Status: SHIPPED | OUTPATIENT
Start: 2023-03-03

## 2023-03-03 RX ORDER — METOCLOPRAMIDE HYDROCHLORIDE 5 MG/ML
10 INJECTION INTRAMUSCULAR; INTRAVENOUS
Status: DISCONTINUED | OUTPATIENT
Start: 2023-03-03 | End: 2023-03-03

## 2023-03-03 RX ADMIN — ONDANSETRON 4 MG: 2 INJECTION INTRAMUSCULAR; INTRAVENOUS at 20:28

## 2023-03-03 RX ADMIN — SODIUM CHLORIDE 1000 ML: 9 INJECTION, SOLUTION INTRAVENOUS at 20:25

## 2023-03-03 ASSESSMENT — LIFESTYLE VARIABLES: HOW MANY STANDARD DRINKS CONTAINING ALCOHOL DO YOU HAVE ON A TYPICAL DAY: PATIENT DOES NOT DRINK

## 2023-03-03 ASSESSMENT — ENCOUNTER SYMPTOMS
EYES NEGATIVE: 1
ABDOMINAL PAIN: 0
VOMITING: 0
NAUSEA: 1
SHORTNESS OF BREATH: 0

## 2023-03-03 ASSESSMENT — PAIN - FUNCTIONAL ASSESSMENT: PAIN_FUNCTIONAL_ASSESSMENT: NONE - DENIES PAIN

## 2023-03-03 NOTE — Clinical Note
Buddy Mason was seen and treated in our emergency department on 3/3/2023. She may return to work on 03/05/2023. If you have any questions or concerns, please don't hesitate to call.       Thad Farnsworth, DO

## 2023-03-03 NOTE — TELEPHONE ENCOUNTER
Moved appointment to [facility] due to patient being [address] [facility] [address]   Phone: [phone]

## 2023-03-03 NOTE — TELEPHONE ENCOUNTER
Patient returned call, scheduled 2023 Annual Pharmacist Visit 03.06.23 [address] [facility] Only     Program: [address] place: No  Recommendation Provided To: Patient/Caregiver: 1 via Telephone  Intervention Detail: Scheduled Appointment  Intervention Accepted By: Patient/Caregiver: 1  Gap Closed?: Yes  Time Spent (min): 10      **Originally in [facility] patient and moved to [facility]. **      Johnny Simon, [address]   Phone: [phone]

## 2023-03-04 NOTE — ED NOTES
Resting in POC without distress, respiration regular and unlabored.      Tiffanie Paige, RN  03/03/23 3537

## 2023-03-04 NOTE — ED TRIAGE NOTES
Pt ambulatory to room with steady gait, states that she has had nausea for past 2 days, no vomiting, general weakness and feels as if blood sugar is up. 265 at home. No medication PTA.

## 2023-03-04 NOTE — ED PROVIDER NOTES
EMERGENCY DEPARTMENT HISTORY AND PHYSICAL EXAM      Date: 3/3/2023  Patient Name: Karlos Merida      History of Presenting Illness     Chief Complaint   Patient presents with    Nausea       History Provided By:     Location/Duration/Severity/Modifying factors   Patient presents to the ED for generalized fatigue, nausea, and fever to 100.5. Symptoms began 2-3 days ago, no known sick contacts. No chills, body aches, chest pain, SOB, vomiting, abdominal pain, urinary symptoms, headache, dizziness or syncope. She reports history of IBS and has loose stool at baseline. She has not vomited and is tolerating po. Nothing makes the symptoms worse or better. She has been working with her PCP to adjust her diabetes medication and has follow up in 2 weeks for that. The history is provided by the patient. There are no other complaints, changes, or physical findings at this time. PCP: Ara Harvey MD    Current Facility-Administered Medications   Medication Dose Route Frequency Provider Last Rate Last Admin    0.9 % sodium chloride bolus  1,000 mL IntraVENous Once Reino Sung, .9 mL/hr at 23 1,000 mL at 23     Current Outpatient Medications   Medication Sig Dispense Refill    dapagliflozin (FARXIGA) 5 MG tablet Farxiga 5 mg tablet      metFORMIN HCl 500 MG/5ML SOLN Take 500 mg by mouth 2 times daily (with meals)      mesalamine (CANASA) 1000 MG suppository mesalamine 1,000 mg rectal suppository   UNWRAP AND INSERT 1 SUPPOSITORY RECTALLY EVERY DAY AT BEDTIME         Past History     Past Medical History:  Past Medical History:   Diagnosis Date    Atrial fibrillation ([address]     Diabetes ([facility] [address]        Past Surgical History:  Past Surgical History:   Procedure Laterality Date     SECTION N/A     x 2       Family History:  History reviewed. No pertinent family history.     Social History:  Social History     Tobacco Use    Smoking status: Never    Smokeless tobacco: Never Substance Use Topics    Alcohol use: Not Currently    Drug use: Never       Allergies: Allergies   Allergen Reactions    Doxycycline Nausea And Vomiting       Medications:  Current Facility-Administered Medications   Medication Dose Route Frequency Provider Last Rate Last Admin    0.9 % sodium chloride bolus  1,000 mL IntraVENous Once [place], .9 mL/hr at 03/03/23 2025 1,000 mL at 03/03/23 2025     Current Outpatient Medications   Medication Sig Dispense Refill    dapagliflozin (FARXIGA) 5 MG tablet Farxiga 5 mg tablet      metFORMIN HCl 500 MG/5ML SOLN Take 500 mg by mouth 2 times daily (with meals)      mesalamine (CANASA) 1000 MG suppository mesalamine 1,000 mg rectal suppository   UNWRAP AND INSERT 1 SUPPOSITORY RECTALLY EVERY DAY AT BEDTIME         Social Determinants of Health:  Social Determinants of Health     Tobacco Use: Low Risk     Smoking Tobacco Use: Never    Smokeless Tobacco Use: Never    Passive Exposure: Not on file   Alcohol Use: Unknown    Frequency of Alcohol Consumption: Not on file    Average Number of Drinks: Patient does not drink    Frequency of Binge Drinking: Not on file   Financial Resource Strain: Not on file   Food Insecurity: Not on file   Transportation Needs: Not on file   Physical Activity: Not on file   Stress: Not on file   Social Connections: Not on file   Intimate Partner Violence: Not on file   Depression: Not on file   Housing Stability: Not on file       Review of Systems     Review of Systems   Constitutional:  Positive for fatigue and fever. Negative for appetite change. HENT: Negative. Eyes: Negative. Respiratory:  Negative for shortness of breath. Cardiovascular: Negative. Gastrointestinal:  Positive for nausea. Negative for abdominal pain and vomiting. Neurological: Negative. All other systems reviewed and are negative. Physical Exam     Physical Exam  Vitals and nursing note reviewed.    Constitutional:       General: She is not in acute distress. Appearance: Normal appearance. She is obese. She is not ill-appearing, toxic-appearing or diaphoretic. HENT:      Head: Normocephalic and atraumatic. Right Ear: External ear normal.      Left Ear: External ear normal.      Nose: Nose normal.      Mouth/Throat:      Mouth: Mucous membranes are moist.   Eyes:      General: No scleral icterus. Right eye: No discharge. Left eye: No discharge. Cardiovascular:      Rate and Rhythm: Regular rhythm. Tachycardia present. Pulses: Normal pulses. Pulmonary:      Effort: Pulmonary effort is normal. No respiratory distress. Breath sounds: Normal breath sounds. No stridor. No wheezing, rhonchi or rales. Abdominal:      General: Abdomen is flat. Bowel sounds are normal. There is no distension. Palpations: Abdomen is soft. Tenderness: There is no abdominal tenderness. There is no guarding or rebound. Musculoskeletal:         General: No tenderness. Cervical back: Neck supple. No tenderness. Lymphadenopathy:      Cervical: No cervical adenopathy. Skin:     General: Skin is warm and dry. Neurological:      General: No focal deficit present. Mental Status: She is alert and oriented to person, place, and time. Sensory: No sensory deficit. Motor: No weakness.       Gait: Gait normal.       Lab and Diagnostic Study Results     Labs -  Recent Results (from the past 24 hour(s))   POCT Glucose    Collection Time: 03/03/23  8:03 PM   Result Value Ref Range    POC Glucose 221 (H) 70 - 110 mg/dL    Performed by: Mikayla Hawkins          Radiologic Studies -   No orders to display     My intepretation(s) if applicable are below:     EKG interpretation(s): NA    Xray Interpretations(s): NA    Cardiac Monitor:    Rate: 100 bpm    Rhythm: regular    Pulse Oximetry Analysis - 98% on room air    Medical Decision Making and ED Course   - I am the first and primary provider for this patient AND AM THE PRIMARY PROVIDER OF RECORD. - I reviewed the vital signs, available nursing notes, past medical history, past surgical history, family history and social history. - Initial assessment performed. The patients presenting problems have been discussed, and the staff are in agreement with the care plan formulated and outlined with them. I have encouraged them to ask questions as they arise throughout their visit. Vital Signs-Reviewed the patient's vital signs. [unfilled]      Records Reviewed: chart reviewed      Provider Notes (Medical Decision Making):     MDM  Number of Diagnoses or Management Options  Nausea  Viral illness  Diagnosis management comments: Ddx including gastroenteritis, hyperglycemia, UTI, flu, covid, other viral illess       Amount and/or Complexity of Data Reviewed  Clinical lab tests: ordered and reviewed  Review and summarize past medical records: yes  Independent visualization of images, tracings, or specimens: yes    Risk of Complications, Morbidity, and/or Mortality  Presenting problems: moderate  Diagnostic procedures: moderate  Management options: low  General comments: HR improved with IV fluids. No findings suggestive of bacterial infection. Patient po tolerant, nausea improved. Stable for discharge with outpatient follow up    Patient Progress  Patient progress: improved         ED Course:   uncomplicated       ------------------------------------------------------------------------------------------------------------        Consultations:       Consultations: NA    See the ED course section, if applicable for details on the content of consultations requested.     Procedures and Critical Care       Performed by: Thad Farnsworth DO    Procedures     NA      Disposition         DISPOSITION  Discharge        Diagnosis     Clinical Impression: Nausea, Viral Illness    Attestations:    Thad Farnsworth DO    Please note that this dictation was completed with [employer] computer voice recognition software. Quite often unanticipated grammatical, syntax, homophones, and other interpretive errors are inadvertently transcribed by the computer software. Please disregard these errors. Please excuse any errors that have escaped final proofreading. Thank you.           Tray Casas,   03/03/23 1327

## 2023-03-04 NOTE — ED NOTES
Reviewed discharge instructions with pt who verbalized understanding.      Shanika Hernandez, HARDIK  03/03/23 5193

## 2023-03-06 ENCOUNTER — TELEPHONE (OUTPATIENT)
Dept: PHARMACY | Facility: CLINIC | Age: 28
End: 2023-03-06

## 2023-03-06 ENCOUNTER — CARE COORDINATION (OUTPATIENT)
Facility: CLINIC | Age: 28
End: 2023-03-06

## 2023-03-06 NOTE — TELEPHONE ENCOUNTER
[facility] HEALTH CLINICAL PHARMACY REVIEW - Be Well with Diabetes    Bao Walker is a 25 y.o. female enrolled in the [facility] Employee Diabetes Program. Patient provided Dovie Opitz with verbal consent to remain in the program for this year. Patient enrolled 7/1/22. Insurance through the following employer: [facility]    Medications:  Current Outpatient Medications   Medication Instructions    dapagliflozin (FARXIGA) 5 MG tablet Farxiga 5 mg tablet    mesalamine (CANASA) 1000 MG suppository mesalamine 1,000 mg rectal suppository   UNWRAP AND INSERT 1 SUPPOSITORY RECTALLY EVERY DAY AT BEDTIME    metFORMIN HCl 500 mg, Oral, 2 TIMES DAILY WITH MEALS    ondansetron (ZOFRAN-ODT) 4 mg, Oral, 3 TIMES DAILY PRN     Current Pharmacy: [facility]  Current testing supplies/frequency:  Generic [facility] meter - Discussed Prodigy    Allergies: Allergies   Allergen Reactions    Doxycycline Nausea And Vomiting      Vitals/Labs:  BP Readings from Last 3 Encounters:   03/03/23 117/70     No results found for: Saumya Quinones        No results found for: LABA1C    No results found for: XIR0LISF  Lab Results   Component Value Date    CHOL 149 05/19/2022    TRIG 243 (A) 05/19/2022    HDL 36 05/19/2022    HDL 4.1 05/19/2022    LDLCALC 73 05/19/2022     ALT   Date Value Ref Range Status   03/03/2023 100 (H) 13 - 56 U/L Final     AST   Date Value Ref Range Status   03/03/2023 41 (H) 10 - 38 U/L Final     The ASCVD Risk score (Lacey DK, et al., 2019) failed to calculate for the following reasons: The 2019 ASCVD risk score is only valid for ages 36 to 78     Lab Results   Component Value Date    CREATININE 0.89 03/03/2023     Estimated Creatinine Clearance: 105 mL/min (based on SCr of 0.89 mg/dL).     Lab Results   Component Value Date/Time    LABGLOM >60 03/03/2023 09:20 PM       Immunizations:  Immunization History   Administered Date(s) Administered    COVID-19, Johanaia Muss BLUE border, Primary or Immunocompromised, (age 12y+), IM, 100 mcg/0.5mL 04/20/2021      Social History:  Social History     Tobacco Use    Smoking status: Never    Smokeless tobacco: Never   Substance Use Topics    Alcohol use: Not Currently     ASSESSMENT:  Initial Program Requirements (Y indicates has completed for the year, N indicates needs to be completed by 07/01/2023): No - Provider Visit for DM (1st)  No- A1c (1st)      Ongoing Program Requirements (Y indicates has completed for the year, N indicates needs to be completed by 12/31/2023): No - Provider Visit for DM (2nd)  N/A - ACC/diabetes educator visit (if A1c over 8%)  No - A1c (2nd)  No - Lipid panel  No - Urine microalbumin  Yes - Pneumococcal vaccination: Had [id number] 9/2022- not due for Prevnar 20 until at least 9/2023  No - Influenza vaccination for Fall 2023  Yes - Medication adherence over 70%  Override  - On statin or contraindication(s) Age < 40 years without additional ASCVD risk factors  No - On ACEi/ARB or contraindication(s)  Recent GILBERT was moderately elevated. Will await next GILBERT before reaching out to PCP        Current medications eligible for copay waiver, up to $600, through [address]:  - Metformin, Mylinda Aver  - Prodigy     Diabetes Care:   - Glycemic Goal: <7.0%. Is not at blood glucose goal but is on Metformin 500mg BID and Farxiga 5mg daily therapy. Type 2 DM under inadequate control as evidenced by most recent A1c of 7.3% on 12/19/22.  - Home blood sugar records:  patient tests 1 time(s) per day. Worried that her A1c may be up. She said that she has seen several readings recently in the 200's. She has been taking her meds as prescribed and reports no changes in diet. She did say she had been sick recently with some sort of Virus.   At the ED they also said she was dehydrated  - Any episodes of hypoglycemia? no  - Known diabetic complications: none  - Reduce Pill [place]: Could consider Xigduo  - Therapy Optimization:   Lengthy discussion had on GLP1 inhibitors. I think one of these agents would greatly benefit this pt. Would help lower a1c, and the weight loss would also likely have a significant effect on other labs along with BG. She did seem pretty interested in the option. More info sent to [facility] so she can discuss with PCP  Briefly discussed Januvia, but I feel GLP would be a better option  - Pt showed signs of dehydration at the ED recently. Could have been related to illness, but could have also been related to [place]. Reports she has been on for a while. She was a little surprised because she says she has been drinking more lately. Explained that this could also be a sign of worsening BG. Will reassess after next A1c which is due very soon    PLAN:  - Consideration(s) for provider:   None at the moment. Pt to discuss GLP1 at upcoming OV  - DM program gaps identified:   Initial requirements: Provider Visit for DM (1st) and A1c (1st)   Ongoing requirements: Provider visit for DM (2nd), A1c (2nd), Lipid panel, Urine microalbumin, and Influenza vaccination for [id number]   - Education to patient: Discussed general issues about diabetes pathophysiology and management., Addressed medication adherence, Overview of Be Well With Diabetes program, Overview of HHP, and Benefit/indication for pneumonia vaccine in patients with diabetes   - Follow up: PCP for identified gaps or as scheduled below  - Upcoming appointments:   Future Appointments   Date Time Provider Rose Santos   3/6/2023  3:30 PM SCHEDULE, [facility] None       SPENCER Kingsley, PharmD, [address] [facility]  Department, toll free: [phone]      For Pharmacy Admin Tracking Only    Program: [address]:  No  Gap Closed?: Yes   Time Spent (min): 30

## 2023-03-13 LAB
AVERAGE GLUCOSE: NORMAL
CHOLESTEROL, TOTAL: 192 MG/DL
CHOLESTEROL/HDL RATIO: 4.9
HBA1C MFR BLD: 7.7 %
HDLC SERPL-MCNC: 39 MG/DL (ref 35–70)
LDL CHOLESTEROL CALCULATED: 109 MG/DL (ref 0–160)
NONHDLC SERPL-MCNC: NORMAL MG/DL
TRIGL SERPL-MCNC: 346 MG/DL
VLDLC SERPL CALC-MCNC: 58 MG/DL

## 2023-05-26 ENCOUNTER — TELEPHONE (OUTPATIENT)
Dept: PHARMACY | Facility: CLINIC | Age: 28
End: 2023-05-26

## 2023-05-26 ENCOUNTER — CLINICAL DOCUMENTATION (OUTPATIENT)
Dept: PHARMACY | Facility: CLINIC | Age: 28
End: 2023-05-26

## 2023-05-26 RX ORDER — SEMAGLUTIDE 1.34 MG/ML
0.5 INJECTION, SOLUTION SUBCUTANEOUS WEEKLY
COMMUNITY

## 2023-05-26 NOTE — TELEPHONE ENCOUNTER
[facility] [facility] Employee Diabetes Program    Christine Dorantes is a 25 y.o. female enrolled in the [place] with Diabetes Program. The goal of this voluntary program is to help employees and covered dependents reach their health maintenance goals in regards to their diabetes diagnosis. According to our records, patient is missing the following requirement(s) that must be completed by July 1, 2023 to avoid discharge from the program:    First diabetes visit with your provider in 2023     Plan:  Attempt made to reach patient by telephone to review above. Spoke to patient - verbalized understanding. I had patient access her patient portal for Dr. Elmira Bennett, they use [employer] ciaran. She was able to screenshot her visit from 3/14/23 and will email for us. She also goes back in June bc ozempic was started in March. Confirmed we got the email in clinicalrx box. Patient has now met midyear requirements.      Enoc Preston, PharmD, [address] Pharmacist  Department: [phone]     For Pharmacy Admin Tracking Only    Program: [address]:  No  Recommendation Provided To: Patient/Caregiver: 1 via Telephone  Intervention Accepted By: Provider: 1  Gap Closed?: Yes   Time Spent (min): 10

## 2023-05-26 NOTE — PROGRESS NOTES
Pharmacy Pop Care Documentation:     AVS received for required office visit on:  3/13/23: Kimi Soria per scanned document

## 2023-05-30 RX ORDER — FUROSEMIDE 20 MG/1
TABLET ORAL
COMMUNITY

## 2023-05-30 RX ORDER — DICYCLOMINE HCL 20 MG
TABLET ORAL
COMMUNITY
Start: 2022-04-04

## 2023-06-07 ENCOUNTER — CLINICAL DOCUMENTATION (OUTPATIENT)
Dept: PHARMACY | Facility: CLINIC | Age: 28
End: 2023-06-07

## 2023-06-07 NOTE — PROGRESS NOTES
Pharmacy Pop Care Documentation:      The application for Chuy Díaz for enrollment into the diabetes management program has been reviewed and accepted on 6/7/23.     [address] Only    Program: [address]:  No  Gap Closed?: Yes   Time Spent (min): 5

## 2023-06-19 ENCOUNTER — TELEPHONE (OUTPATIENT)
Dept: PHARMACY | Facility: CLINIC | Age: 28
End: 2023-06-19

## 2023-06-19 NOTE — TELEPHONE ENCOUNTER
[id number] [facility]  Program    SARAH Villafuerte is a 25 y.o. female currently identified being enrolled in the [address] [place] [address] diabetes  program.  All patients will be connected with a personalized health  through the [employer] ciaran and eligible to receive up to 6 months of Dexcom G7 system for free. By participating in the  program, the patient will:  Create daily habits that improve life and flourishing  Have access to personalized content, insights, and their own certified diabetes educator   Earn rewards for feedback    This patient is also eligible to receive up to a 6 month supply of R Cindy Coleman 116 for 0$ with a prescription. Prescription orders will be pended for:  Dexcom G7 Sensors: Qty-9 with 1 refill    Non- [facility] provider- leeroy Montero MD        Thank you  SPENCER Lopez, PharmD, [address] [place] [address], toll free: [phone]      For Pharmacy Admin Tracking Only    Program: [address]:  No  Recommendation Provided To: Provider: 1 via Fax sent to office  Intervention Detail: New Rx: 1, reason: Patient Preference  Intervention Accepted By: Provider: 1  Gap Closed?: Yes   Time Spent (min): 10

## 2023-10-09 ENCOUNTER — TELEPHONE (OUTPATIENT)
Dept: PHARMACY | Facility: CLINIC | Age: 28
End: 2023-10-09

## 2023-10-09 NOTE — TELEPHONE ENCOUNTER
[facility] Employee Diabetes Program    Andreea Moon is a 25 y.o. female enrolled in the [facility] with Diabetes Program. The goal of this voluntary program is to help employees and covered dependents reach their health maintenance goals in regards to their diabetes diagnosis. According to our records, patient is missing the following requirement(s) that must be completed by December 31, 2023 to avoid discharge from the program:    Urine Microalbumin  Taking an ACEi/ARB, have a contraindication, or valid override     Plan:  Attempt made to reach patient by telephone to review above. Mailbox is full and can not leave a message. Plasmon message sent.     Andreia Velazquez, PharmD, [address] Pharmacist  Department: [facility] Only    Program: [address] [place] [address]  Time Spent (min): 5

## 2023-11-06 ENCOUNTER — CLINICAL DOCUMENTATION (OUTPATIENT)
Dept: PHARMACY | Facility: CLINIC | Age: 28
End: 2023-11-06

## 2023-11-06 NOTE — PROGRESS NOTES
Pharmacy Pop Care Documentation:     Efrain Mixon is being removed from the diabetes management program for the following reason(s):  Per [facility] HR patient no longer has benefits    [address] Only    Program: [address] in place:  No  Gap Closed?: Yes   Time Spent (min): 5

## 2025-04-30 ENCOUNTER — HOSPITAL ENCOUNTER (EMERGENCY)
Age: 30
Discharge: HOME | End: 2025-04-30
Attending: EMERGENCY MEDICINE
Payer: MEDICAID

## 2025-04-30 ENCOUNTER — APPOINTMENT (OUTPATIENT)
Age: 30
End: 2025-04-30
Payer: MEDICAID

## 2025-04-30 VITALS
BODY MASS INDEX: 40.48 KG/M2 | DIASTOLIC BLOOD PRESSURE: 74 MMHG | TEMPERATURE: 98.4 F | OXYGEN SATURATION: 99 % | HEIGHT: 62 IN | WEIGHT: 220 LBS | RESPIRATION RATE: 16 BRPM | SYSTOLIC BLOOD PRESSURE: 132 MMHG | HEART RATE: 75 BPM

## 2025-04-30 DIAGNOSIS — S46.912A MUSCLE STRAIN OF LEFT UPPER ARM, INITIAL ENCOUNTER: Primary | ICD-10-CM

## 2025-04-30 PROCEDURE — 73060 X-RAY EXAM OF HUMERUS: CPT

## 2025-04-30 PROCEDURE — 99283 EMERGENCY DEPT VISIT LOW MDM: CPT

## 2025-04-30 ASSESSMENT — PAIN - FUNCTIONAL ASSESSMENT: PAIN_FUNCTIONAL_ASSESSMENT: 0-10

## 2025-04-30 ASSESSMENT — LIFESTYLE VARIABLES
HOW OFTEN DO YOU HAVE A DRINK CONTAINING ALCOHOL: NEVER
HOW MANY STANDARD DRINKS CONTAINING ALCOHOL DO YOU HAVE ON A TYPICAL DAY: PATIENT DOES NOT DRINK

## 2025-04-30 ASSESSMENT — PAIN DESCRIPTION - FREQUENCY: FREQUENCY: INTERMITTENT

## 2025-04-30 ASSESSMENT — PAIN DESCRIPTION - DESCRIPTORS: DESCRIPTORS: ACHING

## 2025-04-30 ASSESSMENT — PAIN DESCRIPTION - ORIENTATION: ORIENTATION: LEFT

## 2025-04-30 ASSESSMENT — PAIN SCALES - GENERAL: PAINLEVEL_OUTOF10: 0

## 2025-04-30 ASSESSMENT — PAIN DESCRIPTION - LOCATION: LOCATION: SHOULDER

## 2025-05-01 NOTE — ED PROVIDER NOTES
[facility] EMERGENCY DEPARTMENT  EMERGENCY DEPARTMENT ENCOUNTER      Pt Name: Krystal Reid  MRN: [id number]  Birthdate 1995  Date of evaluation: 2025  Provider: Main Tran MD    CHIEF COMPLAINT       Chief Complaint   Patient presents with    Shoulder Pain     left       HPI:  Krystal Reid is a 30 y.o. female who presents to the emergency department pt c/o left upper arm pain, after starting new exercise plan.  No injury. No weakness or numbness no other pain.  No wound. No cp     HPI    Nursing Notes were reviewed.    REVIEW OF SYSTEMS    (2-9 systems for level 4, 10 or more for level 5)     Review of Systems    Except as noted above the remainder of the review of systems was reviewed and negative.       PAST MEDICAL HISTORY     Past Medical History:   Diagnosis Date    Atrial fibrillation (HCC)     Diabetes (HCC)          SURGICAL HISTORY       Past Surgical History:   Procedure Laterality Date     SECTION N/A     x 2    HYSTERECTOMY (CERVIX STATUS UNKNOWN)           CURRENT MEDICATIONS       Previous Medications    DAPAGLIFLOZIN (FARXIGA) 5 MG TABLET    Farxiga 5 mg tablet    DICYCLOMINE (BENTYL) 20 MG TABLET    TAKE 1 TABLET 4 TIMES DAILY AS NEEDED FOR ABDOMINAL PAIN    FUROSEMIDE (LASIX) 20 MG TABLET    furosemide 20 mg tablet   PRN    MESALAMINE (CANASA) 1000 MG SUPPOSITORY    mesalamine 1,000 mg rectal suppository   UNWRAP AND INSERT 1 SUPPOSITORY RECTALLY EVERY DAY AT BEDTIME    METFORMIN  MG/5ML SOLN    Take 500 mg by mouth 2 times daily (with meals)    ONDANSETRON (ZOFRAN-ODT) 4 MG DISINTEGRATING TABLET    Take 1 tablet by mouth 3 times daily as needed for Nausea or Vomiting    SEMAGLUTIDE,0.25 OR 0.5MG/DOS, (OZEMPIC, 0.25 OR 0.5 MG/DOSE,) 2 MG/1.5ML SOPN    Inject 0.5 mg into the skin once a week       ALLERGIES     Doxycycline    FAMILY HISTORY     History reviewed. No pertinent family history.       SOCIAL HISTORY       Social History

## 2025-05-01 NOTE — ED NOTES
I have reviewed discharge instructions with the patient.  The patient verbalized understanding. Patient instructed to follow up with PCP/ortho and encouraged to return to ER with any other concerns or emergent care needed. Patient verbalized understanding.

## 2025-07-09 ENCOUNTER — TRANSCRIBE ORDERS (OUTPATIENT)
Facility: HOSPITAL | Age: 30
End: 2025-07-09

## 2025-07-09 DIAGNOSIS — I48.0 PAROXYSMAL ATRIAL FIBRILLATION (HCC): Primary | ICD-10-CM

## 2025-07-29 ENCOUNTER — HOSPITAL ENCOUNTER (OUTPATIENT)
Age: 30
Discharge: HOME | End: 2025-07-31
Attending: INTERNAL MEDICINE
Payer: MEDICAID

## 2025-07-29 VITALS
WEIGHT: 250 LBS | HEIGHT: 62 IN | BODY MASS INDEX: 46.01 KG/M2 | SYSTOLIC BLOOD PRESSURE: 158 MMHG | DIASTOLIC BLOOD PRESSURE: 88 MMHG

## 2025-07-29 DIAGNOSIS — I48.0 PAROXYSMAL ATRIAL FIBRILLATION (HCC): ICD-10-CM

## 2025-07-29 LAB
ECHO AO ASC DIAM: 2.5 CM
ECHO AO ASCENDING AORTA INDEX: 1.19 CM/M2
ECHO AO ROOT DIAM: 2.9 CM
ECHO AO ROOT INDEX: 1.38 CM/M2
ECHO AV AREA PEAK VELOCITY: 2.6 CM2
ECHO AV AREA VTI: 2.5 CM2
ECHO AV AREA/BSA PEAK VELOCITY: 1.2 CM2/M2
ECHO AV AREA/BSA VTI: 1.2 CM2/M2
ECHO AV MEAN GRADIENT: 4 MMHG
ECHO AV MEAN VELOCITY: 1 M/S
ECHO AV PEAK GRADIENT: 8 MMHG
ECHO AV PEAK VELOCITY: 1.4 M/S
ECHO AV VELOCITY RATIO: 0.79
ECHO AV VTI: 26.2 CM
ECHO BSA: 2.23 M2
ECHO EST RA PRESSURE: 3 MMHG
ECHO LA DIAMETER INDEX: 1.57 CM/M2
ECHO LA DIAMETER: 3.3 CM
ECHO LA TO AORTIC ROOT RATIO: 1.14
ECHO LA VOL A-L A2C: 47 ML (ref 22–52)
ECHO LA VOL A-L A4C: 52 ML (ref 22–52)
ECHO LA VOL BP: 47 ML (ref 22–52)
ECHO LA VOL MOD A2C: 44 ML (ref 22–52)
ECHO LA VOL MOD A4C: 50 ML (ref 22–52)
ECHO LA VOL/BSA BIPLANE: 22 ML/M2 (ref 16–34)
ECHO LA VOLUME AREA LENGTH: 50 ML
ECHO LA VOLUME INDEX A-L A2C: 22 ML/M2 (ref 16–34)
ECHO LA VOLUME INDEX A-L A4C: 25 ML/M2 (ref 16–34)
ECHO LA VOLUME INDEX AREA LENGTH: 24 ML/M2 (ref 16–34)
ECHO LA VOLUME INDEX MOD A2C: 21 ML/M2 (ref 16–34)
ECHO LA VOLUME INDEX MOD A4C: 24 ML/M2 (ref 16–34)
ECHO LV E' LATERAL VELOCITY: 9.98 CM/S
ECHO LV E' SEPTAL VELOCITY: 11.36 CM/S
ECHO LV EF PHYSICIAN: 62 %
ECHO LV EJECTION FRACTION A2C: 63 %
ECHO LV EJECTION FRACTION A4C: 58 %
ECHO LV EJECTION FRACTION BIPLANE: 62 % (ref 55–100)
ECHO LV FRACTIONAL SHORTENING: 33 % (ref 28–44)
ECHO LV INTERNAL DIMENSION DIASTOLE INDEX: 2 CM/M2
ECHO LV INTERNAL DIMENSION DIASTOLIC: 4.2 CM (ref 3.9–5.3)
ECHO LV INTERNAL DIMENSION SYSTOLIC INDEX: 1.33 CM/M2
ECHO LV INTERNAL DIMENSION SYSTOLIC: 2.8 CM
ECHO LV IVSD: 0.9 CM (ref 0.6–0.9)
ECHO LV MASS 2D: 118.7 G (ref 67–162)
ECHO LV MASS INDEX 2D: 56.5 G/M2 (ref 43–95)
ECHO LV POSTERIOR WALL DIASTOLIC: 0.9 CM (ref 0.6–0.9)
ECHO LV RELATIVE WALL THICKNESS RATIO: 0.43
ECHO LVOT AREA: 3.1 CM2
ECHO LVOT AV VTI INDEX: 0.77
ECHO LVOT DIAM: 2 CM
ECHO LVOT MEAN GRADIENT: 3 MMHG
ECHO LVOT PEAK GRADIENT: 5 MMHG
ECHO LVOT PEAK VELOCITY: 1.1 M/S
ECHO LVOT STROKE VOLUME INDEX: 30.2 ML/M2
ECHO LVOT SV: 63.4 ML
ECHO LVOT VTI: 20.2 CM
ECHO MAIN PULMONARY ARTERY DIAMETER: 2.3 CM
ECHO MV A VELOCITY: 0.74 M/S
ECHO MV AREA VTI: 3.4 CM2
ECHO MV E DECELERATION TIME (DT): 150.3 MS
ECHO MV E VELOCITY: 0.99 M/S
ECHO MV E/A RATIO: 1.34
ECHO MV E/E' LATERAL: 9.92
ECHO MV E/E' RATIO (AVERAGED): 9.32
ECHO MV E/E' SEPTAL: 8.71
ECHO MV LVOT VTI INDEX: 0.92
ECHO MV MAX VELOCITY: 1 M/S
ECHO MV MEAN GRADIENT: 2 MMHG
ECHO MV MEAN VELOCITY: 0.6 M/S
ECHO MV PEAK GRADIENT: 4 MMHG
ECHO MV VTI: 18.6 CM
ECHO PV MAX VELOCITY: 1.1 M/S
ECHO PV MEAN GRADIENT: 3 MMHG
ECHO PV MEAN VELOCITY: 0.8 M/S
ECHO PV PEAK GRADIENT: 5 MMHG
ECHO RA END SYSTOLIC VOLUME APICAL 4 CHAMBER INDEX BSA: 14 ML/M2
ECHO RA VOLUME BIPLANE METHOD OF DISKS: 29 ML
ECHO RA VOLUME INDEX BP: 14 ML/M2
ECHO RA VOLUME: 29 ML
ECHO RV FRACTIONAL AREA CHANGE: 44 %
ECHO RV TAPSE: 2.4 CM (ref 1.7–?)

## 2025-07-29 PROCEDURE — 93306 TTE W/DOPPLER COMPLETE: CPT

## (undated) DEVICE — Device

## (undated) DEVICE — INSUFFLATION TUBING,LAPAROSCOPIC: Brand: DEROYAL

## (undated) DEVICE — COUNTER NDL 40 COUNT HLD 70 FOAM BLK ADH W/ MAG

## (undated) DEVICE — TIP IU L8CM DIA6.7MM BLU SIL FLX DISP RUMI II

## (undated) DEVICE — IRRIGATOR VAC TRPT VLV 5MMX3CM -- HYDRO SURG PLUS

## (undated) DEVICE — SKIN AFFIX SURG ADHESIVE 72/CS 0.55ML: Brand: MEDLINE

## (undated) DEVICE — SYSTEM LBL CORRECT MED 2 MED

## (undated) DEVICE — DRAPE,UNDERBUTTOCKS,PCH,STERILE: Brand: MEDLINE

## (undated) DEVICE — SOL IRR NACL 0.9% 500ML POUR --

## (undated) DEVICE — OBTRTR BLDELSS OPT 8MM DISP -- DA VINICI XI - SNGL USE

## (undated) DEVICE — GARMENT COMPRESSION STANDARD FOR 17IN CALF FLOWTRON

## (undated) DEVICE — THE STERILE LIGHT HANDLE COVER IS USED WITH STERIS SURGICAL LIGHTING AND VISUALIZATION SYSTEMS.

## (undated) DEVICE — COVER,MAYO STAND,STERILE: Brand: MEDLINE

## (undated) DEVICE — TIP ENDO SUCT AND IRR STR 5 MM DIAM 33 CM LEN VLV AND H W/

## (undated) DEVICE — GOWN,AURORA,FABRIC-REINFORCED,X-LARGE: Brand: MEDLINE

## (undated) DEVICE — FOLEY TRAY DRAINAGE BG 16 FR STBL DEV SURSTP LUBRI-SIL IC

## (undated) DEVICE — TROCARS: Brand: KII® OPTICAL ACCESS SYSTEM

## (undated) DEVICE — SYSTEM ES CUP DIA3.5CM PNEUMO OCCL BLLN DISP FOR CLIN POS

## (undated) DEVICE — Y-TYPE TUR/BLADDER IRRIGATION SET, REGULATING CLAMP

## (undated) DEVICE — SPONGE GZ 4X4 IN 16-PLY DETECTABLE W/ DMT MSTR TAG

## (undated) DEVICE — PREP PAD BNS: Brand: CONVERTORS

## (undated) DEVICE — SUTURE MCRYL SZ 4-0 L18IN ABSRB UD L19MM PS-2 3/8 CIR PRIM Y496G

## (undated) DEVICE — SOL IRR STRL H2O 3000ML BG -- USE ITEM 173640

## (undated) DEVICE — SEAL UNIV 5-8MM DISP BX/10 -- DA VINCI XI - SNGL USE

## (undated) DEVICE — BARRIER TISS ADH ABSRB 3X4IN -- GYNECARE INTERCEED

## (undated) DEVICE — SYR 50ML LR LCK 1ML GRAD NSAF --

## (undated) DEVICE — LEGGINGS, PAIR, 33X51 XL, STERILE: Brand: MEDLINE

## (undated) DEVICE — VISUALIZATION SYSTEM: Brand: CLEARIFY

## (undated) DEVICE — DISPOSABLE DISSECTOR: Brand: EPIX ® LAPAROSCOPIC DISSECTOR

## (undated) DEVICE — 40588 XL ADVANCED TRENDELENBURG POSITIONING KIT: Brand: 40588 XL ADVANCED TRENDELENBURG POSITIONING KIT

## (undated) DEVICE — GLOVE ORANGE PI 7 1/2   MSG9075

## (undated) DEVICE — GLOVE SURG SZ 65 THK91MIL LTX FREE SYN POLYISOPRENE

## (undated) DEVICE — SUTURE VLOC 90 2/0 VL 6 GS-22 VLOCM2105

## (undated) DEVICE — ARM DRAPE

## (undated) DEVICE — KIT,ANTI FOG,W/SPONGE & FLUID,SOFT PACK: Brand: MEDLINE

## (undated) DEVICE — PACK,ABDOMINAL,MAJOR: Brand: MEDLINE

## (undated) DEVICE — SUCTION IRRIGATOR: Brand: ENDOWRIST

## (undated) DEVICE — PREP SKN CHLRAPRP APL 26ML STR --

## (undated) DEVICE — COLUMN DRAPE